# Patient Record
Sex: MALE | Race: WHITE | HISPANIC OR LATINO | Employment: UNEMPLOYED | URBAN - NONMETROPOLITAN AREA
[De-identification: names, ages, dates, MRNs, and addresses within clinical notes are randomized per-mention and may not be internally consistent; named-entity substitution may affect disease eponyms.]

---

## 2018-11-22 ENCOUNTER — HOSPITAL ENCOUNTER (EMERGENCY)
Facility: HOSPITAL | Age: 28
Discharge: HOME | End: 2018-11-22
Admitting: PHYSICIAN ASSISTANT

## 2018-11-22 VITALS
TEMPERATURE: 97.4 F | DIASTOLIC BLOOD PRESSURE: 63 MMHG | OXYGEN SATURATION: 98 % | RESPIRATION RATE: 16 BRPM | WEIGHT: 180 LBS | HEART RATE: 60 BPM | HEIGHT: 71 IN | SYSTOLIC BLOOD PRESSURE: 122 MMHG | BODY MASS INDEX: 25.2 KG/M2

## 2018-11-22 DIAGNOSIS — H10.31 ACUTE BACTERIAL CONJUNCTIVITIS OF RIGHT EYE: ICD-10-CM

## 2018-11-22 DIAGNOSIS — H16.001 CORNEAL ULCER OF RIGHT EYE: Primary | ICD-10-CM

## 2018-11-22 DIAGNOSIS — T15.91XA FOREIGN BODY OF RIGHT EYE, INITIAL ENCOUNTER: ICD-10-CM

## 2018-11-22 PROCEDURE — 99283 EMERGENCY DEPT VISIT LOW MDM: CPT

## 2018-11-22 RX ORDER — ERYTHROMYCIN 5 MG/G
OINTMENT OPHTHALMIC
Qty: 1 EACH | Refills: 0 | Status: ON HOLD | OUTPATIENT
Start: 2018-11-22 | End: 2021-10-15

## 2018-11-22 RX ORDER — ERYTHROMYCIN 5 MG/G
OINTMENT OPHTHALMIC EVERY 12 HOURS
Status: DISCONTINUED | OUTPATIENT
Start: 2018-11-22 | End: 2018-11-22 | Stop reason: HOSPADM

## 2018-11-22 RX ORDER — TETRACAINE HYDROCHLORIDE 5 MG/ML
2 SOLUTION OPHTHALMIC ONCE
Status: COMPLETED | OUTPATIENT
Start: 2018-11-22 | End: 2018-11-22

## 2018-11-22 RX ADMIN — ERYTHROMYCIN: 5 OINTMENT OPHTHALMIC at 18:58

## 2018-11-22 RX ADMIN — TETRACAINE HYDROCHLORIDE 2 DROP: 5 SOLUTION OPHTHALMIC at 18:22

## 2018-11-22 RX ADMIN — FLUORESCEIN SODIUM 1 STRIP: 1 STRIP OPHTHALMIC at 18:23

## 2018-11-23 ENCOUNTER — HOSPITAL ENCOUNTER (EMERGENCY)
Facility: HOSPITAL | Age: 28
Discharge: HOME | End: 2018-11-23
Admitting: EMERGENCY MEDICINE

## 2018-11-23 VITALS
DIASTOLIC BLOOD PRESSURE: 66 MMHG | WEIGHT: 180 LBS | BODY MASS INDEX: 25.2 KG/M2 | SYSTOLIC BLOOD PRESSURE: 105 MMHG | HEIGHT: 71 IN | RESPIRATION RATE: 17 BRPM | OXYGEN SATURATION: 99 % | HEART RATE: 71 BPM | TEMPERATURE: 98 F

## 2018-11-23 DIAGNOSIS — H20.9 IRITIS OF RIGHT EYE: ICD-10-CM

## 2018-11-23 DIAGNOSIS — H57.11 ACUTE RIGHT EYE PAIN: Primary | ICD-10-CM

## 2018-11-23 PROCEDURE — 25010000002 TDAP 5-2.5-18.5 LF-MCG/0.5 SUSPENSION: Performed by: PHYSICIAN ASSISTANT

## 2018-11-23 PROCEDURE — 90471 IMMUNIZATION ADMIN: CPT | Performed by: PHYSICIAN ASSISTANT

## 2018-11-23 PROCEDURE — 96372 THER/PROPH/DIAG INJ SC/IM: CPT

## 2018-11-23 PROCEDURE — 90715 TDAP VACCINE 7 YRS/> IM: CPT | Performed by: PHYSICIAN ASSISTANT

## 2018-11-23 PROCEDURE — 25010000003 MORPHINE 5 MG/ML SOLUTION: Performed by: EMERGENCY MEDICINE

## 2018-11-23 PROCEDURE — 99283 EMERGENCY DEPT VISIT LOW MDM: CPT

## 2018-11-23 RX ORDER — MORPHINE SULFATE 5 MG/ML
4 INJECTION, SOLUTION INTRAMUSCULAR; INTRAVENOUS ONCE
Status: COMPLETED | OUTPATIENT
Start: 2018-11-23 | End: 2018-11-23

## 2018-11-23 RX ORDER — HYDROCODONE BITARTRATE AND ACETAMINOPHEN 7.5; 325 MG/1; MG/1
1 TABLET ORAL EVERY 4 HOURS PRN
Qty: 8 TABLET | Refills: 0 | Status: ON HOLD | OUTPATIENT
Start: 2018-11-23 | End: 2021-10-15

## 2018-11-23 RX ORDER — PROPARACAINE HYDROCHLORIDE 5 MG/ML
2 SOLUTION/ DROPS OPHTHALMIC ONCE
Status: COMPLETED | OUTPATIENT
Start: 2018-11-23 | End: 2018-11-23

## 2018-11-23 RX ORDER — ONDANSETRON 4 MG/1
4 TABLET, ORALLY DISINTEGRATING ORAL ONCE
Status: COMPLETED | OUTPATIENT
Start: 2018-11-23 | End: 2018-11-23

## 2018-11-23 RX ADMIN — PROPARACAINE HYDROCHLORIDE 2 DROP: 5 SOLUTION/ DROPS OPHTHALMIC at 17:13

## 2018-11-23 RX ADMIN — TETANUS TOXOID, REDUCED DIPHTHERIA TOXOID AND ACELLULAR PERTUSSIS VACCINE, ADSORBED 0.5 ML: 5; 2.5; 8; 8; 2.5 SUSPENSION INTRAMUSCULAR at 18:06

## 2018-11-23 RX ADMIN — MORPHINE SULFATE 4 MG: 5 INJECTION, SOLUTION INTRAMUSCULAR; INTRAVENOUS at 18:06

## 2018-11-23 RX ADMIN — ONDANSETRON 4 MG: 4 TABLET, ORALLY DISINTEGRATING ORAL at 18:06

## 2018-11-23 RX ADMIN — FLUORESCEIN SODIUM 1 STRIP: 1 STRIP OPHTHALMIC at 17:13

## 2018-11-23 NOTE — DISCHARGE INSTRUCTIONS
Uveítis  (Uveitis)  La uveítis es la hinchazón e irritación del triston. La mayoría de las veces, afecta la región ocular central (úvea). Hay diferentes tipos de uveítis:  · Iritis. Ana Paula tipo afecta la parte coloreada del triston.  · Uveítis intermedia. Ana Paula tipo afecta la parte media del triston.  · Uveítis posterior. Ana Paula tipo afecta la parte posterior del triston.  · Panuveítis. Ana Paula tipo afecta todas las capas del triston.  CUIDADOS EN EL HOGAR  · [place] los medicamentos solamente mellissa se lo haya indicado el médico. Estos incluyen los medicamentos recetados y de venta raghu.  · Siga las indicaciones del médico respecto de las actividades que son seguras para usted.  · No use productos que contengan tabaco. Estos incluyen cigarrillos, tabaco para mascar y cigarrillos electrónicos. Si necesita ayuda para dejar de fumar, consulte al médico.  · Concurra a todas las visitas de control mellissa se lo haya indicado el médico. [place] es importante.  SOLICITE AYUDA SI:  · Los medicamentos no le hacen efecto.  SOLICITE AYUDA DE INMEDIATO SI:  · Hay enrojecimiento en kesha o ambos ojos.  · La stefani le molesta mucho en los ojos.  · Siente dolor en el triston.  · Siente dolor continuo en el triston.  · No puede alley igual que antes.  Esta información no tiene mellissa fin reemplazar el consejo del médico. Asegúrese de hacerle al médico cualquier pregunta que tenga.  Document Released: 05/03/2016 Document Revised: 05/03/2016 Document Reviewed: 12/23/2015  Elsevier Interactive Patient Education © 2018 [employer]

## 2018-11-23 NOTE — ED NOTES
Pt in rp filling out papers that registration gave them.     Yumiko Sarmiento, RN  11/22/18 1913

## 2018-11-23 NOTE — ED PROVIDER NOTES
Subjective   History of Present Illness   He is a 28 year old male with no chronic metabolic condition who presents today with the chief complaint of right eye pain and redness since last one week. The pain, redness, watery discharge all started at once. Since past two days he also complains about pain in the right eye when he tries to look down. He denies any symptoms in the left eye. Complains of associated sorethroat. Denies fever, runny nose, ear ache, ear fullness, ear pain, sinus pain, chest pain, dizziness. He works as a  and he denies any possibility of foreign body in the  Eye. He has already seen two physicians for the same reason and was prescribed oflox eye drops. He started the drops one day before and report mild improvement of redness but no change in the pain.    Review of Systems   Constitutional: Negative.    HENT: Negative.    Eyes: Positive for photophobia (Right), pain (Right), discharge (Right), redness (Right) and visual disturbance (Right).   Respiratory: Negative.    Cardiovascular: Negative.    Gastrointestinal: Negative.    Genitourinary: Negative.    Musculoskeletal: Negative.    Skin: Negative.    Allergic/Immunologic: Negative.    Neurological: Negative.        History reviewed. No pertinent past medical history.    No Known Allergies    History reviewed. No pertinent surgical history.    History reviewed. No pertinent family history.    Social History     Socioeconomic History   • Marital status:      Spouse name: Not on file   • Number of children: Not on file   • Years of education: Not on file   • Highest education level: Not on file   Tobacco Use   • Smoking status: Never Smoker   Substance and Sexual Activity   • Alcohol use: No     Frequency: Never   • Drug use: No           Objective   Physical Exam   Constitutional: He is oriented to person, place, and time. He appears well-developed and well-nourished.   HENT:   Head: Normocephalic and atraumatic.   Left Ear:  External ear normal.   Right middlle ear erythema noted.    Eyes: EOM are normal. Pupils are equal, round, and reactive to light. Right eye exhibits discharge and exudate. Right eye exhibits no hordeolum. No foreign body present in the right eye. Left eye exhibits no chemosis, no discharge, no exudate and no hordeolum. No foreign body present in the left eye. Right conjunctiva is injected. Right conjunctiva has no hemorrhage. Left conjunctiva is not injected. Left conjunctiva has no hemorrhage.       Neck: Normal range of motion.   Cardiovascular: Regular rhythm and normal heart sounds.   Pulmonary/Chest: Effort normal and breath sounds normal.   Abdominal: Soft. Bowel sounds are normal.   Musculoskeletal: Normal range of motion.   Neurological: He is alert and oriented to person, place, and time.   Skin: Skin is warm and dry.       Procedures           ED Course  ED Course as of Nov 22 1907   Thu Nov 22, 2018 1903 Fluorescein eye exam was done by Galina and the findings were noticed both by her and me.  [SR]      ED Course User Index  [SR] Tatyana Zendejas PA-C      He is a 28 year old  male who presents to the ED with a chief complaint of redness and pain in the right eye since past one week. The fluorescein exam revealed corneal ulcer, scratch marks on right conjunctiva and a small round foreign body was noted at 7 o clock position was noted. Efforts were made to irrigate the eye and get red of foreign body. But it is still intact. Patient was instructed to follow up with ophthalmologist as needed for the above findings. A list of ophthalmologists has been given to him. He was discharged with erythromycin ointment and recommended to continue oflox drops which he has been  Using since past 2 days. He was stable on discharge            MDM      Final diagnoses:   Corneal ulcer of right eye   Acute bacterial conjunctivitis of right eye   Foreign body of right eye, initial encounter            Cristiana  MARQUIS Joe  11/22/18 1907

## 2018-11-23 NOTE — ED PROVIDER NOTES
Subjective     Eye Problem   Associated symptoms: discharge, headaches, photophobia and redness      Patient is a 28-year-old  man presents to ED with his significant other who is the .  The patient's chief complaint is recurrent eye discomfort.  She describes that he has had some eye issue for a long time now where he wakes up with it intermittently irritated.  He would usually resolve quickly.  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  In the past week, he noticed some irritation after work.  He denies any high velocity impact or any injury at work that contributed to his eye discomfort.  He initially went to [facility] and was given some type of saline solution a week ago.  He returned to the same institution about 4 days ago and was prescribed ofloxacin otic suspension.  The patient had been using it briefly which provided minimal relief.  Yesterday morning, he felt as if something was stuck in his right upper lateral eyelid and came to this ER then.  This examiner assisted the provider signed on to the chart.  On his eye examination, he was noted to have horizontally linear multiple abrasions within a large [place] in the middle of his.  He also had a smaller deeper corneal abrasion at 7 o'clock about 1 mm in diameter.  No foreign body was grossly palpated or evaluated.  Dr. Meraz, the ER physician, evaluated the patient as well.  He was advised to start gentamicin ointment in conjunction with his ofloxacin and to follow-up with ophthalmology.  Because yesterday was Thanksgiving, he was instructed to return to ED if he could not get in soon enough to the ophthalmology and had recurrent issues.    The patient describes yesterday after the examination he felt fine.  This morning he felt fine.  This afternoon, he felt a sensation as he points his inner upper lid that was similar to yesterday's sensation on the outer upper lid.  He does complain of  "continued photophobia.  He reports the yellow Drainage has resolved.  He has continued watery drainage.  He denies any visual loss.  He denies any other injury or impact. He denies facial pain.  He has not been working.  He denies wearing contacts.  He is not up-to-date with tetanus vaccination.    Review of Systems   Eyes: Positive for photophobia, pain, discharge and redness. Negative for visual disturbance.   Neurological: Positive for headaches.   All other systems reviewed and are negative.      History reviewed. No pertinent past medical history.    No Known Allergies    History reviewed. No pertinent surgical history.    History reviewed. No pertinent family history.    Social History     Socioeconomic History   • Marital status:      Spouse name: Not on file   • Number of children: Not on file   • Years of education: Not on file   • Highest education level: Not on file   Tobacco Use   • Smoking status: Never Smoker   Substance and Sexual Activity   • Alcohol use: No     Frequency: Never   • Drug use: No       Prior to Admission medications    Medication Sig Start Date End Date Taking? Authorizing Provider   erythromycin (ROMYCIN) 5 MG/GM ophthalmic ointment Administer  to the right eye Every 4 (Four) Hours While Awake. 11/22/18   Tatyana Zendejas PA-C       Medications   fluorescein ophthalmic strip 1 strip (not administered)   proparacaine (ALCAINE) 0.5 % ophthalmic solution 2 drop (2 drops Right Eye Given by Other 11/23/18 1713)   fluorescein ophthalmic strip 1 strip (1 strip Both Eyes Given by Other 11/23/18 1713)   Tdap (BOOSTRIX) injection 0.5 mL (0.5 mL Intramuscular Given 11/23/18 1806)   Morphine injection 4 mg (4 mg Intramuscular Given 11/23/18 1806)   ondansetron ODT (ZOFRAN-ODT) disintegrating tablet 4 mg (4 mg Oral Given 11/23/18 1806)       /62   Pulse 72   Temp 98.2 °F (36.8 °C)   Resp 17   Ht 180.3 cm (71\")   Wt 81.6 kg (180 lb)   SpO2 97%   BMI 25.10 kg/m²       Objective "   Physical Exam   Constitutional: He is oriented to person, place, and time. He appears well-developed and well-nourished.   HENT:   Head: Normocephalic and atraumatic.   Right Ear: External ear normal.   Left Ear: External ear normal.   Nose: Nose normal.   Mouth/Throat: Oropharynx is clear and moist.   Eyes: EOM are normal. Pupils are equal, round, and reactive to light. Lids are everted and swept, no foreign bodies found. Right eye exhibits no chemosis, no discharge, no exudate and no hordeolum. No foreign body present in the right eye. Right conjunctiva is injected. Right conjunctiva has no hemorrhage. No scleral icterus. Right eye exhibits normal extraocular motion and no nystagmus. Left eye exhibits normal extraocular motion and no nystagmus.   Slit lamp exam:       The right eye shows corneal abrasion and fluorescein uptake. The right eye shows no corneal flare, no corneal ulcer, no foreign body, no hyphema and no anterior chamber bulge.       Mild to moderately swollen upper eyelid    Eye exam also completed with Dr. Jaki Baker   Neck: Normal range of motion. Neck supple. No tracheal deviation present.   Cardiovascular: Normal rate, regular rhythm, normal heart sounds and intact distal pulses. Exam reveals no gallop and no friction rub.   No murmur heard.  Pulmonary/Chest: Effort normal and breath sounds normal. No respiratory distress. He has no wheezes. He has no rales. He exhibits no tenderness.   Musculoskeletal: Normal range of motion. He exhibits no edema, tenderness or deformity.   Neurological: He is alert and oriented to person, place, and time. He has normal reflexes.   Skin: Skin is warm and dry. Capillary refill takes less than 2 seconds. No rash noted. No erythema. No pallor.   Psychiatric: He has a normal mood and affect. His behavior is normal. Judgment and thought content normal.   Vitals reviewed.      Procedures         Lab Results (last 24 hours)     ** No results found for the last 24  hours. **          No results found.    ED Course        Eye exam from yesterday:     Visual Acuity-Left  20/20  --            at 11/22/18 1814           Visual Acuity-Right  20/25  --            at 11/22/18 1814               Dr. Baker does not believe that he has presentations of corneal abrasions or ulcerations.  He is concerned that he may have iritis.  We called Dr. Romero, opthamologist on call and his technician, Juan, called back. She then directly spoke with Dr. Romero.  He requests the patient be discharged with continuation of the ointment and ophthalmic suspension.  He wants the patient in his office tomorrow at 8:30 AM.  I relayed this to the patient and his wife.  They voiced understanding and will be discharged in stable condition.    MDM    Final diagnoses:   Acute right eye pain   Iritis of right eye          Alexandra Ennis PA  11/23/18 2088

## 2018-11-23 NOTE — DISCHARGE INSTRUCTIONS
Conjuntivitis bacteriana  (Bacterial Conjunctivitis)  La conjuntivitis bacteriana es ana infección de la conjuntiva. Esta es la membrana transparente que recubre la parte lance del triston y la superficie interna del párpado. Esta afección puede causar los siguientes síntomas en el triston:  · Color alexandra o silva.  · Picazón de la piel.  La causa de esta afección son las bacterias. Se contagia fácilmente de ana persona a otra y de un triston al otro .  CUIDADOS EN EL HOGAR  Medicamentos  · [place] o aplique el antibiótico mellissa se lo haya indicado el médico. No deje de fabiola los antibióticos o de aplicárselos aunque comience a sentirse mejor.  · [place] o aplique los medicamentos de venta raghu y los recetados solamente mellissa se lo haya indicado el médico.  · No toque el párpado con el frasco de gotas para los ojos o el tubo de pomada.  Control de las molestias  · Límpiese la secreción del triston con un paño húmedo y tibio o con ana torunda de algodón.  · Colóquese un paño limpio y frío sobre el triston. Lorenza esto sarmad 10 a 20 minutos, 3 o 4 veces al día.  Instrucciones generales  · No use lentes de contacto hasta que la irritación haya desaparecido. Use anteojos hasta que el médico lo autorice a ponerse lentes de contacto.  · No se aplique maquillaje en los ojos hasta que los síntomas hayan desaparecido. Deseche el maquillaje kathie.  · Cambie o lave quiles almohada todos los días.  · No comparta las toallas o los paños con ninguna persona.  · Lave megan rossana frecuentemente con agua y jabón. Use toallas de papel para secarse las rossana.  · No se toque ni se frote los ojos.  · No conduzca ni use maquinaria pesada si tiene la visión borrosa.  SOLICITE AYUDA SI:  · Tiene fiebre.  · Los síntomas no mejoran después de 10 días de tratamiento.  SOLICITE AYUDA DE INMEDIATO SI:  · Tiene fiebre y los síntomas empeoran repentinamente.  · Siente dolor muy intenso cuando mueve el triston.  · El crow:  ? Le duele.  ? Se le enrojece.  ? Está hinchado.  · Pierde  la visión repentinamente.  Esta información no tiene mellissa fin reemplazar el consejo del médico. Asegúrese de hacerle al médico cualquier pregunta que tenga.  Document Released: 06/18/2013 Document Revised: 04/10/2017 Document Reviewed: 09/29/2016  [employer] Interactive Patient Education © 2018 [employer]      Ulcera de córnea  (Corneal Ulcer)  Ana úlcera de córnea es ana llaga en la córnea. La córnea es la cubierta transparente en la parte anterior y central del triston.  CAUSAS  La mayoría de las úlceras de córnea son causadas por infecciones, paty también puede sasha otras causas.  · Infecciones bacterianas. Ana infección bacteriana puede causar ana úlcera corneal si:  ? Se usan los lentes de contacto demasiado tiempo (especialmente sarmad la noche) o no están debidamente cuidados.  ? Ana lesión ocular que permite que las bacterias infecten el área de la lesión.  · Infecciones virales. Ana infección viral puede causar ana úlcera de córnea si:  ? El triston se infecta con un virus, mellissa un herpes simple (herpes labial), virus de la varicela, herpes zóster o virus.  · Infecciones por hongos. Ana infección por hongos puede causar ana úlcera de córnea si:  ? Hubo ana lesión por contacto con ana planta o material de ana planta.  ? Uso excesivo de gotas antiinflamatorias.  ? Dill sistema inmunológico está debilitado.  ? Las lentes de contacto se usan inadecuadamente lo que favorece la infección.  · Cuerpos extraños en el triston, mellissa arena, juan, o pequeñas fragmentos de juan o metal.  · Ojos secos.  · Ciertos trastornos que impiden que los párpados se cierren por completo, mellissa la parálisis de [place].  · Lentes de contacto, en especial lentes de contacto blandos de uso prolongado. Los lentes de contacto pueden:  ? Raspar la superficie de la córnea, permitiendo que las bacterias entren por la herida.  ? Retener la suciedad debajo del lente de contacto, lo cual puede dañar la córnea.  ? Albergar bacterias y hongos, por lo que es  más probable que se produzcan las infecciones bacterianas.  ? Obstruir la entrada de oxígeno a la córnea, por lo que es más probable que se produzcan infecciones.  SÍNTOMAS  · Dolor en los ojos que generalmente es intenso.  · Visión borrosa.  · Sensibilidad a la stefani.  · Secreción espesa o de pus debajo del triston.  · Enrojecimiento del triston.  · Sensación de tener algo en el triston.  · Triston lloroso o que pica.  · Ardor o quemazón.  Algunas úlceras, si son muy grandes, pueden verse mellissa ana megan lance en la córnea.  DIAGNÓSTICO  Le harán un examen de vista. El médico podría usar un tipo especial de microscopio (lámpara de hendidura) para examinar la córnea. Podrá aplicarle gotas en el triston para hacer que la úlcera sea más fácil de examinar. Si se sospecha que ana infección causó la úlcera, se puede fabiola unas muestras de tejido o cultivos del triston. Le aplicará gotas para anestesiar el triston antes de fabiola las muestras o cultivos. Las muestras o cultivos se examinarán en el laboratorio para verificar si hay bacterias, virus u hongos.  TRATAMIENTO  El tratamiento de la úlcera de la córnea depende de la causa. Si la úlcera es grave, es posible que le den gotas antibióticas hasta que el médico conozca los resultados de los estudios. Otros tratamientos pueden ser:  · Gotas o ungüentos antibacterianos, antivirales o antimicóticos .  · Extracción del cuerpo extraño que causó la lesión en el triston.  · Lágrimas artificiales o lentes de contacto que actúan mellissa vendaje si la intensidad de la sequedad ocular fue la causa de la úlcera corneal.  · Medicamentos para el dolor de venta raghu o con receta.  · Gotas con corticoides si el triston se inflama y se hincha.  · Antibióticos por vía oral.  · Inyección de medicamentos bajo la membrana delgada que cubre el globo ocular (conjuntiva). [place] permite que la medicina llegue a la úlcera en dosis altas.  · Parche ocular para evitar la irritación fracisco intermitente y brillante. No se colocará un  parche en el triston si la úlcera fue causada por ana infección bacteriana.  Si la úlcera provoca ana cicatriz en la córnea que interfiere con la visión, puede ser necesaria la hospitalización y la cirugía para reemplazar la córnea (trasplante de córnea).  INSTRUCCIONES PARA EL CUIDADO EN EL HOGAR  · Si se lo shepherd recetado, tome los antibióticos, colóquese las gotas o el ungüento, según las indicaciones. Tómelos todos, aunque comience a sentirse mejor. Tendrá que aplicarse gotas con ana frecuencia entre unos pocos minutos y ana hora, sarmad varios días. Podrá ser necesario activar la alarma del reloj sarmad la noche, según la frecuencia indicada. [place] es absolutamente necesario.  · [place] sólo medicamentos de venta raghu o recetados, según las indicaciones del médico.  · Aplique las lágrimas artificiales, según sea necesario, si tiene los ojos secos.  · Notoque ni frote megan ojos, porque puede aumentar la irritación y propagar la infección.  · Evite el uso de maquillaje.  · Manténgase en ana taylor oscura y utilice lentes de sol para reducir la sensibilidad a la stefani.  · Aplique compresas frías en el triston para aliviar las molestias y la hinchazón.  · Si tiene un parche en el triston no deberá manejar ni operar maquinarias. Tendrá reducida la visión y la capacidad para juzgar distancias.  · Noconduzca ni opere maquinarias hasta que lo autorice el médico. En estas condiciones no puede juzgar correctamente las distancias.  · Concurra a las consultas de control con quiles médico según las indicaciones.  · No conduzca vehículos hasta que el médico la autorice. Si normalmente usa lentes de contacto, siga estas reglas generales para evitar el riesgo de ana úlcera de córnea:  ? Nouse lentes de contacto mientras duerme.  ? Lávese las rossana antes de retirar los lentes de contacto.  ? Esterilice y almacene adecuadamente los lentes de contacto.  ? Limpie regularmente la caja en la que los guarda.  ? Noaplique saliva ni agua del grifo para  limpiar o humedecer megan lentes de contacto.  ? Quítese los lentes de contacto si siente que megan ojos se irritan. Podrá volver a colocarlas ana vez que megan ojos estén mejor.    SOLICITE ATENCIÓN MÉDICA DE INMEDIATO SI:  · Nota algún cambio en quiles visión.  · El dolor empeora en vez de mejorar.  · Aumenta la supuración en el triston.    ASEGÚRESE DE QUE:  · Comprende estas instrucciones.  · Controlará quiles afección.  · Recibirá ayuda de inmediato si no mejora o si empeora.    Esta información no tiene mellissa fin reemplazar el consejo del médico. Asegúrese de hacerle al médico cualquier pregunta que tenga.  Document Released: 12/18/2006 Document Revised: 01/08/2016 Document Reviewed: 05/20/2014  [employer] Interactive Patient Education © 2017 [employer]      Conjuntivitis bacteriana  (Bacterial Conjunctivitis)  La conjuntivitis bacteriana es ana infección de la membrana transparente que cubre la parte lance del triston y la jana interna del párpado (conjuntiva). Cuando los vasos sanguíneos de la conjuntiva se inflaman, el triston se pone alexandra o toro, y es posible que le pique. La conjuntivitis bacteriana se transmite fácilmente de ana persona a la otra (es contagiosa). También se contagia fácilmente de un triston al otro.  CAUSAS  La causa de esta afección son varias bacterias comunes. Puede contraer la infección si entra en contacto con otra persona que está infectada. También puede entrar en contacto con elementos que estén contaminados con la bacteria, mellissa ana toalla para la jana, solución para lentes de contacto o maquillaje para ojos.  FACTORES DE RIESGO  Es más probable que esta afección se manifieste en las personas que:  · Mantienen contacto físico con personas que tienen la infección.  · Usan lentes de contacto.  · Tienen sinusitis.  · Mott tenido ana lesión o cirugía reciente en el triston.  · Tiene debilitado el sistema de defensa del organismo (sistema inmunitario).  · Tienen ana afección médica que causa sequedad en los  ojos.  SÍNTOMAS  Los síntomas de esta afección incluyen lo siguiente:  · Ojos rojos.  · Lagrimeo u ojos llorosos.  · Picazón en los ojos.  · Sensación de ardor en los ojos.  · Secreción espesa y amarillenta del triston. Esta secreción puede convertirse en ana costra en el párpado sarmad la noche, que hace que los párpados se peguen.  · Hinchazón de los párpados.  · Visión borrosa.  DIAGNÓSTICO  El médico puede diagnosticar esta afección en función de los síntomas y los antecedentes médicos. El médico también puede obtener ana muestra de la secreción del triston para averiguar la causa de la infección. [place] no se hace con frecuencia.  TRATAMIENTO  El tratamiento de esta afección incluye lo siguiente:  · Gotas o ungüento para los ojos con antibiótico para erradicar la infección con más rapidez y evitar el contagio a otras personas.  · Antibióticos por vía oral para tratar infecciones que no responden a las gotas o los ungüentos, o que wills más de 10 días.  · Paños húmedos fríos (compresas frías) sobre los ojos.  · Lágrimas artificiales aplicadas 2 a 6 veces por día.  INSTRUCCIONES PARA EL CUIDADO EN EL HOGAR  Medicamentos  · [place] los antibióticos o aplíqueselos mellissa se lo haya indicado el médico. No deje de fabiola los antibióticos o de aplicárselos aunque comience a sentirse mejor.  · [place] o aplíquese los medicamentos de venta raghu y recetados solamente mellissa se lo haya indicado el médico.  · Tenga mucho cuidado de no tocar el borde del párpado con el frasco de las gotas para los ojos o el tubo del ungüento cuando aplica los medicamentos en el triston afectado. [place] evitará que se contagie la infección al otro triston o a otras personas.  Control de las molestias  · Retire suavemente la secreción de los ojos con un paño tibio y húmedo o con ana torunda de algodón.  · Aplíquese un paño frío y limpio en el triston sarmad 10 a 20 minutos, 3 a 4 veces al día.  Instrucciones generales  · No use lentes de contacto hasta que haya desaparecido  la inflamación y quiles médico le indique que es seguro usarlos nuevamente. Pregúntele al médico cómo esterilizar o reemplazar megan lentes de contacto antes de usarlos nuevamente. Use anteojos hasta que pueda volver a usar los lentes de contacto.  · Evite usar maquillaje en los ojos hasta que la inflamación se haya dhaval. Descarte cosméticos viejos para los ojos que puedan estar contaminados.  · Cambie o lave quiles almohada todos los días.  · No comparta las toallas o los paños. [place] puede propagar la infección.  · Lave megan rossana frecuentemente con agua y jabón. Use toallas de papel para secarse las rossana.  · Evite tocarse o frotarse los ojos.  · No conduzca ni use maquinaria pesada si quiles visión es borrosa.  SOLICITE ATENCIÓN MÉDICA SI:  · Tiene fiebre.  · Los síntomas no mejoran después de 10 días de tratamiento.    SOLICITE ATENCIÓN MÉDICA DE INMEDIATO SI:  · Tiene fiebre y los síntomas empeoran repentinamente.  · Siente dolor intenso cuando mueve el triston.  · Siente dolor u observa hinchazón o enrojecimiento en la jana.  · Pierde la visión repentinamente.    Esta información no tiene mellissa fin reemplazar el consejo del médico. Asegúrese de hacerle al médico cualquier pregunta que tenga.  Document Released: 09/27/2006 Document Revised: 04/10/2017 Document Reviewed: 09/29/2016  [employer] Interactive Patient Education © 2017 [employer]

## 2018-11-23 NOTE — ED NOTES
"Pt wants a \"packet\" that registration told them about \"to help pay the bill\", registration aware.     Yumiko Sarmiento RN  11/22/18 1908    "

## 2021-10-13 ENCOUNTER — HOSPITAL ENCOUNTER (INPATIENT)
Facility: HOSPITAL | Age: 31
LOS: 2 days | Discharge: HOME | End: 2021-10-15
Attending: FAMILY MEDICINE | Admitting: INTERNAL MEDICINE

## 2021-10-13 ENCOUNTER — APPOINTMENT (OUTPATIENT)
Dept: CT IMAGING | Facility: HOSPITAL | Age: 31
End: 2021-10-13

## 2021-10-13 DIAGNOSIS — J39.2 NASOPHARYNGEAL MASS: Primary | ICD-10-CM

## 2021-10-13 DIAGNOSIS — U07.1 COVID: ICD-10-CM

## 2021-10-13 LAB
ALBUMIN SERPL-MCNC: 4.5 G/DL (ref 3.5–5.2)
ALBUMIN/GLOB SERPL: 1.6 G/DL
ALP SERPL-CCNC: 93 U/L (ref 39–117)
ALT SERPL W P-5'-P-CCNC: 16 U/L (ref 1–41)
ANION GAP SERPL CALCULATED.3IONS-SCNC: 7 MMOL/L (ref 5–15)
AST SERPL-CCNC: 37 U/L (ref 1–40)
BASOPHILS # BLD AUTO: 0.02 10*3/MM3 (ref 0–0.2)
BASOPHILS NFR BLD AUTO: 0.4 % (ref 0–1.5)
BILIRUB SERPL-MCNC: 0.3 MG/DL (ref 0–1.2)
BUN SERPL-MCNC: 7 MG/DL (ref 6–20)
BUN/CREAT SERPL: 10.3 (ref 7–25)
CALCIUM SPEC-SCNC: 9 MG/DL (ref 8.6–10.5)
CHLORIDE SERPL-SCNC: 97 MMOL/L (ref 98–107)
CO2 SERPL-SCNC: 32 MMOL/L (ref 22–29)
CREAT SERPL-MCNC: 0.68 MG/DL (ref 0.76–1.27)
DEPRECATED RDW RBC AUTO: 40.4 FL (ref 37–54)
EOSINOPHIL # BLD AUTO: 0.01 10*3/MM3 (ref 0–0.4)
EOSINOPHIL NFR BLD AUTO: 0.2 % (ref 0.3–6.2)
ERYTHROCYTE [DISTWIDTH] IN BLOOD BY AUTOMATED COUNT: 12.4 % (ref 12.3–15.4)
GFR SERPL CREATININE-BSD FRML MDRD: 139 ML/MIN/1.73
GLOBULIN UR ELPH-MCNC: 2.8 GM/DL
GLUCOSE SERPL-MCNC: 120 MG/DL (ref 65–99)
HCT VFR BLD AUTO: 43.3 % (ref 37.5–51)
HETEROPH AB SER QL LA: NEGATIVE
HGB BLD-MCNC: 14.5 G/DL (ref 13–17.7)
IMM GRANULOCYTES # BLD AUTO: 0.01 10*3/MM3 (ref 0–0.05)
IMM GRANULOCYTES NFR BLD AUTO: 0.2 % (ref 0–0.5)
LYMPHOCYTES # BLD AUTO: 1.15 10*3/MM3 (ref 0.7–3.1)
LYMPHOCYTES NFR BLD AUTO: 24.1 % (ref 19.6–45.3)
MCH RBC QN AUTO: 29.6 PG (ref 26.6–33)
MCHC RBC AUTO-ENTMCNC: 33.5 G/DL (ref 31.5–35.7)
MCV RBC AUTO: 88.4 FL (ref 79–97)
MONOCYTES # BLD AUTO: 0.79 10*3/MM3 (ref 0.1–0.9)
MONOCYTES NFR BLD AUTO: 16.6 % (ref 5–12)
NEUTROPHILS NFR BLD AUTO: 2.79 10*3/MM3 (ref 1.7–7)
NEUTROPHILS NFR BLD AUTO: 58.5 % (ref 42.7–76)
NRBC BLD AUTO-RTO: 0 /100 WBC (ref 0–0.2)
PLATELET # BLD AUTO: 169 10*3/MM3 (ref 140–450)
PMV BLD AUTO: 10 FL (ref 6–12)
POTASSIUM SERPL-SCNC: 3.8 MMOL/L (ref 3.5–5.2)
PROT SERPL-MCNC: 7.3 G/DL (ref 6–8.5)
RBC # BLD AUTO: 4.9 10*6/MM3 (ref 4.14–5.8)
S PYO AG THROAT QL: NEGATIVE
SARS-COV-2 RNA PNL SPEC NAA+PROBE: DETECTED
SODIUM SERPL-SCNC: 136 MMOL/L (ref 136–145)
WBC # BLD AUTO: 4.77 10*3/MM3 (ref 3.4–10.8)

## 2021-10-13 PROCEDURE — 25010000002 IOPAMIDOL 61 % SOLUTION: Performed by: PHYSICIAN ASSISTANT

## 2021-10-13 PROCEDURE — 25010000002 VANCOMYCIN 10 G RECONSTITUTED SOLUTION

## 2021-10-13 PROCEDURE — 25010000002 CEFTRIAXONE PER 250 MG: Performed by: FAMILY MEDICINE

## 2021-10-13 PROCEDURE — 85025 COMPLETE CBC W/AUTO DIFF WBC: CPT | Performed by: PHYSICIAN ASSISTANT

## 2021-10-13 PROCEDURE — 99285 EMERGENCY DEPT VISIT HI MDM: CPT

## 2021-10-13 PROCEDURE — 80053 COMPREHEN METABOLIC PANEL: CPT | Performed by: PHYSICIAN ASSISTANT

## 2021-10-13 PROCEDURE — 70491 CT SOFT TISSUE NECK W/DYE: CPT

## 2021-10-13 PROCEDURE — 25010000002 DEXAMETHASONE PER 1 MG: Performed by: PHYSICIAN ASSISTANT

## 2021-10-13 PROCEDURE — 99223 1ST HOSP IP/OBS HIGH 75: CPT | Performed by: OTOLARYNGOLOGY

## 2021-10-13 PROCEDURE — 86308 HETEROPHILE ANTIBODY SCREEN: CPT | Performed by: PHYSICIAN ASSISTANT

## 2021-10-13 PROCEDURE — 87880 STREP A ASSAY W/OPTIC: CPT | Performed by: PHYSICIAN ASSISTANT

## 2021-10-13 PROCEDURE — 25010000002 VANCOMYCIN 1 G RECONSTITUTED SOLUTION 1 EACH VIAL: Performed by: FAMILY MEDICINE

## 2021-10-13 PROCEDURE — 87635 SARS-COV-2 COVID-19 AMP PRB: CPT | Performed by: PHYSICIAN ASSISTANT

## 2021-10-13 PROCEDURE — 87081 CULTURE SCREEN ONLY: CPT | Performed by: PHYSICIAN ASSISTANT

## 2021-10-13 PROCEDURE — 87040 BLOOD CULTURE FOR BACTERIA: CPT | Performed by: PHYSICIAN ASSISTANT

## 2021-10-13 RX ORDER — DEXAMETHASONE SODIUM PHOSPHATE 10 MG/ML
10 INJECTION INTRAMUSCULAR; INTRAVENOUS ONCE
Status: COMPLETED | OUTPATIENT
Start: 2021-10-13 | End: 2021-10-13

## 2021-10-13 RX ORDER — ONDANSETRON 2 MG/ML
4 INJECTION INTRAMUSCULAR; INTRAVENOUS EVERY 6 HOURS PRN
Status: DISCONTINUED | OUTPATIENT
Start: 2021-10-13 | End: 2021-10-15 | Stop reason: HOSPADM

## 2021-10-13 RX ORDER — NALOXONE HCL 0.4 MG/ML
0.4 VIAL (ML) INJECTION
Status: DISCONTINUED | OUTPATIENT
Start: 2021-10-13 | End: 2021-10-15 | Stop reason: HOSPADM

## 2021-10-13 RX ORDER — SODIUM CHLORIDE 0.9 % (FLUSH) 0.9 %
10 SYRINGE (ML) INJECTION AS NEEDED
Status: DISCONTINUED | OUTPATIENT
Start: 2021-10-13 | End: 2021-10-15 | Stop reason: HOSPADM

## 2021-10-13 RX ORDER — SODIUM CHLORIDE 0.9 % (FLUSH) 0.9 %
10 SYRINGE (ML) INJECTION EVERY 12 HOURS SCHEDULED
Status: DISCONTINUED | OUTPATIENT
Start: 2021-10-13 | End: 2021-10-15 | Stop reason: HOSPADM

## 2021-10-13 RX ORDER — DEXTROSE, SODIUM CHLORIDE, AND POTASSIUM CHLORIDE 5; .45; .15 G/100ML; G/100ML; G/100ML
100 INJECTION INTRAVENOUS CONTINUOUS
Status: DISCONTINUED | OUTPATIENT
Start: 2021-10-13 | End: 2021-10-15 | Stop reason: HOSPADM

## 2021-10-13 RX ORDER — MORPHINE SULFATE 2 MG/ML
1 INJECTION, SOLUTION INTRAMUSCULAR; INTRAVENOUS EVERY 4 HOURS PRN
Status: DISCONTINUED | OUTPATIENT
Start: 2021-10-13 | End: 2021-10-15 | Stop reason: HOSPADM

## 2021-10-13 RX ORDER — DEXAMETHASONE SODIUM PHOSPHATE 4 MG/ML
6 INJECTION, SOLUTION INTRA-ARTICULAR; INTRALESIONAL; INTRAMUSCULAR; INTRAVENOUS; SOFT TISSUE DAILY
Status: DISCONTINUED | OUTPATIENT
Start: 2021-10-14 | End: 2021-10-15 | Stop reason: HOSPADM

## 2021-10-13 RX ORDER — ACETAMINOPHEN 325 MG/1
650 TABLET ORAL EVERY 6 HOURS PRN
Status: DISCONTINUED | OUTPATIENT
Start: 2021-10-13 | End: 2021-10-15 | Stop reason: HOSPADM

## 2021-10-13 RX ADMIN — SODIUM CHLORIDE 1 G: 9 INJECTION, SOLUTION INTRAVENOUS at 20:03

## 2021-10-13 RX ADMIN — Medication 2000 MG: at 08:54

## 2021-10-13 RX ADMIN — Medication 10 ML: at 21:20

## 2021-10-13 RX ADMIN — POTASSIUM CHLORIDE, DEXTROSE MONOHYDRATE AND SODIUM CHLORIDE 100 ML/HR: 150; 5; 450 INJECTION, SOLUTION INTRAVENOUS at 20:03

## 2021-10-13 RX ADMIN — IOPAMIDOL 100 ML: 612 INJECTION, SOLUTION INTRAVENOUS at 10:00

## 2021-10-13 RX ADMIN — VANCOMYCIN HYDROCHLORIDE 1000 MG: 1 INJECTION, POWDER, LYOPHILIZED, FOR SOLUTION INTRAVENOUS at 21:19

## 2021-10-13 RX ADMIN — DEXAMETHASONE SODIUM PHOSPHATE 10 MG: 10 INJECTION INTRAMUSCULAR; INTRAVENOUS at 08:47

## 2021-10-13 RX ADMIN — SODIUM CHLORIDE, POTASSIUM CHLORIDE, SODIUM LACTATE AND CALCIUM CHLORIDE 1000 ML: 600; 310; 30; 20 INJECTION, SOLUTION INTRAVENOUS at 08:47

## 2021-10-13 NOTE — PROGRESS NOTES
"Pharmacy Dosing Service  Pharmacokinetics  Vancomycin Initial Evaluation  Assessment/Action/Plan:  Loading dose: 2000mg  Current Order: Vancomycin 1000 mg IVPB every 8 hours  Current end date:10/13/2021  Levels: Vancomycin trough ordered before dose due on 10/14/2021 at 1000  Additional antimicrobial agent(s): Ceftriaxone    Vancomycin dosage initiated based on population pharmacokinetic parameters. Pharmacy will continue to follow daily and adjust dose accordingly.     Subjective:  Paras Bronson is a 30 y.o. male with a Vancomycin \"Pharmacy to Dose\" consult for the treatment of SSTI , day 1 of 5 of treatment.    AUC Model Data:  Loading dose: 2000mg  Regimen: 1000 mg IV every 8 hours.  Start time: 18:00 on 10/13/2021  Exposure target: AUC24 (range)400-600 mg/L.hr   AUC24,ss: 431 mg/L.hr  PAUC*: 58 %  Ctrough,ss: 11.2 mg/L  Pconc*: 11 %  Tox.: 7 %      Objective:  Ht: 182.9 cm (72\"); Wt: 78 kg (172 lb)  Estimated Creatinine Clearance: 178.4 mL/min (A) (by C-G formula based on SCr of 0.68 mg/dL (L)).   Creatinine   Date Value Ref Range Status   10/13/2021 0.68 (L) 0.76 - 1.27 mg/dL Final      Lab Results   Component Value Date    WBC 4.77 10/13/2021      Baseline culture results:  Microbiology Results (last 10 days)       Procedure Component Value - Date/Time    COVID PRE-OP / PRE-PROCEDURE SCREENING ORDER (NO ISOLATION) - Swab, Nasal Cavity [[id number]]  (Abnormal) Collected: 10/13/21 0857    Lab Status: Final result Specimen: Swab from Nasal Cavity Updated: 10/13/21 0957    Narrative:      The following orders were created for panel order COVID PRE-OP / PRE-PROCEDURE SCREENING ORDER (NO ISOLATION) - Swab, Nasal Cavity.  Procedure                               Abnormality         Status                     ---------                               -----------         ------                     COVID-19,[facility]...[[id number]]  Abnormal            Final result                 Please view results for " these tests on the individual orders.    COVID-19,[facility] IN-HOUSE,Nasal Swab No Transport Media 3-4 HR TAT - Swab, Nasal Cavity [[id number]]  (Abnormal) Collected: 10/13/21 0857    Lab Status: Final result Specimen: Swab from Nasal Cavity Updated: 10/13/21 0957     COVID19 Detected    Narrative:      Fact sheet for providers: https://www.fda.gov/media/077274/download     Fact sheet for patients: https://www.fda.gov/media/962398/download    Test performed by PCR.    Consider negative results in combination with clinical observations, patient history, and epidemiological information.    Rapid Strep A Screen - Swab, Throat [[id number]]  (Normal) Collected: 10/13/21 0856    Lab Status: Final result Specimen: Swab from Throat Updated: 10/13/21 0925     Strep A Ag Negative            Gabriele Ring, PharmD  10/13/21 16:18 CDT

## 2021-10-13 NOTE — H&P
[facility] Medicine Services  HISTORY AND PHYSICAL    Date of Admission: 10/13/2021  Primary Care Physician: Provider, No Known    Subjective     Chief Complaint: Throat pain    History of Present Illness  30 year old male with no significant past medical or surgical history that presents with throat pain, voice change and poor appetite. He first noted pain and went to [facility] ER 4 weeks ago and received IV antibiotic and a prescription for steroids. With this he felt Ok for about 2 weeks. At that point the pain and difficulty swallowing started to come back and worsened progressively. States 4 days ago he felt very tired and lost his appetite. Has been only been able to have oral liquids since yesterday and decided to come for help. He is noted to have a large tonsil L>R with exudate covering. CT neck shows a large adenoid that is close to compromise the airway. ENT is consulted and recommended admission.    Incidentally he has been found to have COVID 19 and this may justify the weakness and lack of appetite he has had for the last 4 days.     Note patient is [place] speaking and the interview was conducted in [place].     Review of Systems   Otherwise complete ROS reviewed and negative except as mentioned in the HPI.    Past Medical History:   Past Medical History:   Diagnosis Date   • Tonsillitis      Past Surgical History:History reviewed. No pertinent surgical history.  Social History:  reports that he has never smoked. He does not have any smokeless tobacco history on file. He reports that he does not drink alcohol and does not use drugs.    Family History:   Patient denies family medical history.        Allergies:  No Known Allergies    Medications:  Prior to Admission medications    Medication Sig Start Date End Date Taking? Authorizing Provider   erythromycin (ROMYCIN) 5 MG/GM ophthalmic ointment Administer  to the right eye Every 4 (Four) Hours While Awake.  "11/22/18   Tatyana Zendejas PA-C   HYDROcodone-acetaminophen (NORCO) 7.5-325 MG per tablet Take 1 tablet by mouth Every 4 (Four) Hours As Needed for Moderate Pain . 11/23/18   Jaki Baker MD     I have utilized all available immediate resources to obtain, update, and review the patient's current medications.    Objective     Vital Signs: /73   Pulse 69   Temp 98.4 °F (36.9 °C) (Oral)   Resp 18   Ht 182.9 cm (72\")   Wt 78 kg (172 lb)   SpO2 94%   BMI 23.33 kg/m²   Physical Exam  Constitutional:       Appearance: He is well-developed. He is ill-appearing. He is not toxic-appearing or diaphoretic.   HENT:      Head: Normocephalic and atraumatic.      Right Ear: External ear normal.      Left Ear: External ear normal.      Nose: Nose normal.      Mouth/Throat:      Mouth: Mucous membranes are dry.      Pharynx: Oropharyngeal exudate present.      Comments: Very large tonsils specially on the left which seems to occupy most of the retro uvular area  Eyes:      General:         Right eye: No discharge.         Left eye: No discharge.      Extraocular Movements: Extraocular movements intact.      Conjunctiva/sclera: Conjunctivae normal.      Pupils: Pupils are equal, round, and reactive to light.   Neck:      Vascular: No JVD.   Cardiovascular:      Rate and Rhythm: Normal rate and regular rhythm.      Heart sounds: Normal heart sounds. No murmur heard.      Pulmonary:      Effort: Pulmonary effort is normal. No respiratory distress.      Breath sounds: Normal breath sounds. No wheezing or rales.   Chest:      Chest wall: No tenderness.   Abdominal:      General: Bowel sounds are normal. There is no distension.      Palpations: Abdomen is soft.      Tenderness: There is no abdominal tenderness. There is no guarding or rebound.   Musculoskeletal:         General: No tenderness or deformity. Normal range of motion.      Cervical back: Normal range of motion and neck supple. No rigidity.      Right lower leg: " No edema.      Left lower leg: No edema.   Skin:     General: Skin is dry.      Capillary Refill: Capillary refill takes less than 2 seconds.      Coloration: Skin is pale.      Findings: No rash.   Neurological:      General: No focal deficit present.      Mental Status: He is alert and oriented to person, place, and time. Mental status is at baseline.      Cranial Nerves: No cranial nerve deficit.      Sensory: No sensory deficit.      Motor: No abnormal muscle tone.      Deep Tendon Reflexes: Reflexes normal.   Psychiatric:         Mood and Affect: Mood normal.         Behavior: Behavior normal.              Results Reviewed:  Lab Results (last 24 hours)     Procedure Component Value Units Date/Time    COVID PRE-OP / PRE-PROCEDURE SCREENING ORDER (NO ISOLATION) - Swab, Nasal Cavity [[id number]]  (Abnormal) Collected: 10/13/21 0857    Specimen: Swab from Nasal Cavity Updated: 10/13/21 0957    Narrative:      The following orders were created for panel order COVID PRE-OP / PRE-PROCEDURE SCREENING ORDER (NO ISOLATION) - Swab, Nasal Cavity.  Procedure                               Abnormality         Status                     ---------                               -----------         ------                     COVID-19,[facility]...[[id number]]  Abnormal            Final result                 Please view results for these tests on the individual orders.    COVID-19,[facility] IN-HOUSE,Nasal Swab No Transport Media 3-4 HR TAT - Swab, Nasal Cavity [[id number]]  (Abnormal) Collected: 10/13/21 0857    Specimen: Swab from Nasal Cavity Updated: 10/13/21 0957     COVID19 Detected    Narrative:      Fact sheet for providers: https://www.fda.gov/media/608165/download     Fact sheet for patients: https://www.fda.gov/media/364359/download    Test performed by PCR.    Consider negative results in combination with clinical observations, patient history, and epidemiological information.    Blood Culture - Blood, Arm, Left  [[id number]] Collected: 10/13/21 0843    Specimen: Blood from Arm, Left Updated: 10/13/21 0947    Blood Culture - Blood, Arm, Right [[id number]] Collected: 10/13/21 0843    Specimen: Blood from Arm, Right Updated: 10/13/21 0947    Rapid Strep A Screen - Swab, Throat [[id number]]  (Normal) Collected: 10/13/21 0856    Specimen: Swab from Throat Updated: 10/13/21 0925     Strep A Ag Negative    Beta Strep Culture, Throat - Swab, Throat [[id number]] Collected: 10/13/21 0856    Specimen: Swab from Throat Updated: 10/13/21 0925    Comprehensive Metabolic Panel [[id number]]  (Abnormal) Collected: 10/13/21 0843    Specimen: Blood Updated: 10/13/21 0924     Glucose 120 mg/dL      BUN 7 mg/dL      Creatinine 0.68 mg/dL      Sodium 136 mmol/L      Potassium 3.8 mmol/L      Chloride 97 mmol/L      CO2 32.0 mmol/L      Calcium 9.0 mg/dL      Total Protein 7.3 g/dL      Albumin 4.50 g/dL      ALT (SGPT) 16 U/L      AST (SGOT) 37 U/L      Alkaline Phosphatase 93 U/L      Total Bilirubin 0.3 mg/dL      eGFR Non African Amer 139 mL/min/1.73      Globulin 2.8 gm/dL      A/G Ratio 1.6 g/dL      BUN/Creatinine Ratio 10.3     Anion Gap 7.0 mmol/L     Narrative:      GFR Normal >60  Chronic Kidney Disease <60  Kidney Failure <15      Mononucleosis Screen [[id number]]  (Normal) Collected: 10/13/21 0843    Specimen: Blood Updated: 10/13/21 0906     Monospot Negative    CBC & Differential [[id number]]  (Abnormal) Collected: 10/13/21 0843    Specimen: Blood Updated: 10/13/21 0859    Narrative:      The following orders were created for panel order CBC & Differential.  Procedure                               Abnormality         Status                     ---------                               -----------         ------                     CBC Auto Differential[[id number]]        Abnormal            Final result                 Please view results for these tests on the individual orders.    CBC Auto Differential [[id number]]  (Abnormal) Collected:  10/13/21 0843    Specimen: Blood Updated: 10/13/21 0859     WBC 4.77 10*3/mm3      RBC 4.90 10*6/mm3      Hemoglobin 14.5 g/dL      Hematocrit 43.3 %      MCV 88.4 fL      MCH 29.6 pg      MCHC 33.5 g/dL      RDW 12.4 %      RDW-SD 40.4 fl      MPV 10.0 fL      Platelets 169 10*3/mm3      Neutrophil % 58.5 %      Lymphocyte % 24.1 %      Monocyte % 16.6 %      Eosinophil % 0.2 %      Basophil % 0.4 %      Immature Grans % 0.2 %      Neutrophils, Absolute 2.79 10*3/mm3      Lymphocytes, Absolute 1.15 10*3/mm3      Monocytes, Absolute 0.79 10*3/mm3      Eosinophils, Absolute 0.01 10*3/mm3      Basophils, Absolute 0.02 10*3/mm3      Immature Grans, Absolute 0.01 10*3/mm3      nRBC 0.0 /100 WBC         Imaging Results (Last 24 Hours)     Procedure Component Value Units Date/Time    CT Soft Tissue Neck With Contrast [[id number]] Collected: 10/13/21 1003     Updated: 10/13/21 1028    Narrative:      Indication: Sore throat, stuffy nose, unable to drink and 8        DOSE LENGTH PRODUCT: 263 mGy cm. Automated exposure control was also  utilized to decrease patient radiation dose.     Technique: Axial CT of the neck after the uneventful administration of  Isovue iodinated contrast material. Sagittal and coronal reformations  were also provided for review.      Comparison: None     Findings:      The anterior and mid portions of the nasal cavity are patent, however,  there is complete attenuation of the posterior nasal cavity as well as a  complete attenuation of the nasopharyngeal airway extending down into  the oropharyngeal airway. In particular, there is an approximately 7.8 x  6.4 x 2.9 cm enhancing soft tissue mass which completely fills the  nasopharyngeal airway, extending anteriorly into the posterior nasal  cavity as well as inferiorly into the oropharyngeal airway (series  5-image [id number] and series 3-image 44). There is quite a bit of mass  effect, with expansion of the nasopharyngeal airway and displacement of  the  parapharyngeal fat. I do not see evidence of an infiltrative process  in regards to extension beyond the mucosal space or  involvement/destructive bony change along the skull base/clivus or into  the prevertebral soft tissues. This is in the region of the adenoid  tissue and could reflect a massive adenoid hypertrophy. I do not see any  discrete soft tissue abscess. More inferiorly, the tonsillar fossae and  base of tongue are unremarkable. The oropharyngeal and hypopharyngeal  airways remained patent. Laryngeal structures are unremarkable. The  subglottic airway is unremarkable.     Included intracranial contents are unremarkable. Orbital contents are  unremarkable. Chronic mucosal thickening identified throughout the  paranasal sinuses. Major salivary glands are unremarkable. No obvious  oral cavity lesion. Floor of mouth soft tissues are unremarkable. There  are no inflammatory changes identified in the deep neck beyond the  mucosal space. No suspicious cervical adenopathy. Cervical vascular  structures are patent. The thyroid gland is homogeneous. No acute bony  abnormality is seen. Lung apices are clear. Complete opacification of  the left mastoids with fluid in the left middle ear cavity as well.       Impression:      Impression:        1. There is an enhancing soft tissue mass which completely fills and  also expands the nasopharyngeal airway, extending anteriorly into the  posterior nasal cavity and extending inferiorly into the oropharyngeal  airway. My primary consideration is a massive adenoid hypertrophy  without abscess given the location and lack of aggressive feature. After  accounting for the size, which is quite impressive, there appears  circumscribed and there are no aggressive features such as evidence for  extension beyond the mucosal space or destructive bony change. Neoplasia  seems less likely. ENT follow-up and/or follow-up CT should be  considered.  2. Left mastoid and left middle ear  effusion which is likely secondary  to eustachian tube obstruction.  This report was finalized on 10/13/2021 10:25 by Dr Gaston Quezada, .        I have personally reviewed and interpreted the radiology studies and ECG obtained at time of admission.     Assessment / Plan     Assessment:   Active Hospital Problems    Diagnosis    • **Nasopharyngeal mass    • COVID-19      Plan:   Admit to medical floor  Vitals every 4 hours  Npo except ice chips and sips   IVF D5W NS KCL 20 mEq at 100 cc/hour  Empiric Rocephin/Vancomycin   ENT consult    Pain control > Tylenol > Morphine  Zofran 4 mg q6h prn N/V    Continue Dexamethasone with COVID dosing of 6 mg IVP daily  Otherwise COVID is mild     Code Status/Advanced Care Plan: Full    The patient's surrogate decision maker is wife, see records.     I discussed my findings and recommendations with the patient.    Estimated length of stay is over 2 midnights.     The patient was seen and examined by me on 10/13/2021 at 1430.    Electronically signed by Jhonny Campoverde MD, 10/13/21, 15:41 CDT.

## 2021-10-13 NOTE — ED PROVIDER NOTES
Subjective   History of Present Illness    Patient is a pleasant 30-year-old  gentleman who presents to ED with his wife who is a .  Chief complaint is worsening sore throat and painful swallowing.  His wife describes the symptoms first began about 2 weeks ago.  He had some nasal congestion and pain with swallowing particularly solid foods.  He noted white patches in the back of his throat.  When this first occurred, he did seek medical attention [place] [facility].  His wife describes that they checked for Covid, strep throat, and mono all which were negative.  He did receive a dose of IV antibiotics and steroids.  The patient reports he felt better and his throat pain seemed to have cleared up.  He was prescribed a week's course of antibiotics though they are unsure the name of these antibiotics.  Once he completed the medications at home, the patient reports his symptoms started to worsen bit by a bit over the past 1 week.  He had recurrent nasal congestion with worsening sore throat particularly again with solids.  He was able to eat some Mexican food yesterday at lunch though he chewed his food into fine bits.  Then he switched to purely Pedialyte.  The patient reports he is urinating.  Is unsure the coloration of his urine.  He has not eaten solids today.  He denies drooling but he does complain of pain with any type of swallowing.  He denies any measured fever.  He denies any neck pain.  Denies any pain with moving his head or neck.  His wife describes that their daughter had a fever 2 days ago but denies any other respiratory symptoms.  Patient denies any associated cough.  He is from [place] and is vaccinated.  She describes that he is otherwise healthy person.  He did have an episode of diarrhea yesterday but that subsided and was fleeting.    Review of Systems   Constitutional: Positive for activity change and appetite change. Negative for fever.   HENT: Positive for congestion, sinus pressure,  "sore throat, trouble swallowing and voice change. Negative for drooling.    Respiratory: Negative for cough and shortness of breath.    Cardiovascular: Negative.    Gastrointestinal: Negative.    Genitourinary: Negative.    Musculoskeletal: Negative.    Skin: Negative.    Neurological: Negative.    Psychiatric/Behavioral: Negative.    All other systems reviewed and are negative.      Past Medical History:   Diagnosis Date   • Tonsillitis        No Known Allergies    History reviewed. No pertinent surgical history.    History reviewed. No pertinent family history.    Social History     Socioeconomic History   • Marital status:    Tobacco Use   • Smoking status: Never Smoker   Substance and Sexual Activity   • Alcohol use: No   • Drug use: No       Prior to Admission medications    Medication Sig Start Date End Date Taking? Authorizing Provider   erythromycin (ROMYCIN) 5 MG/GM ophthalmic ointment Administer  to the right eye Every 4 (Four) Hours While Awake. 11/22/18   Tatyana Zendejas PA-C   HYDROcodone-acetaminophen (NORCO) 7.5-325 MG per tablet Take 1 tablet by mouth Every 4 (Four) Hours As Needed for Moderate Pain . 11/23/18   Jaki Baker MD       Medications   lactated ringers bolus 1,000 mL (0 mL Intravenous Stopped 10/13/21 0917)   vancomycin 2000 mg/500 mL 0.9% NS IVPB ([facility]) (0 mg Intravenous Stopped 10/13/21 1111)   dexamethasone (DECADRON) injection 10 mg (10 mg Intravenous Given 10/13/21 0847)   iopamidol (ISOVUE-300) 61 % injection 100 mL (100 mL Intravenous Given 10/13/21 1000)       /73   Pulse 88   Temp 98.4 °F (36.9 °C) (Oral)   Resp 16   Ht 182.9 cm (72\")   Wt 78 kg (172 lb)   SpO2 97%   BMI 23.33 kg/m²       Objective   Physical Exam  Vitals reviewed.   Constitutional:       General: He is not in acute distress.     Appearance: He is well-developed. He is not ill-appearing, toxic-appearing or diaphoretic.   HENT:      Head:      Jaw: There is normal jaw occlusion. No " tenderness, swelling or pain on movement.      Salivary Glands: Right salivary gland is not diffusely enlarged or tender. Left salivary gland is not diffusely enlarged or tender.      Comments: Patient does have potato voice     Right Ear: There is impacted cerumen.      Left Ear: There is impacted cerumen.      Mouth/Throat:      Mouth: Mucous membranes are dry.      Pharynx: Uvula midline. Pharyngeal swelling, oropharyngeal exudate, posterior oropharyngeal erythema and uvula swelling present.      Tonsils: Tonsillar exudate and tonsillar abscess present. 4+ on the left.      Comments: The patient's left tonsil was large and covered in exudate.  There is some moderate swelling on the left posterior pharynx extending into the entirety of the left side soft palate.  His uvula is midline.  There is some swelling on the right side but not as significant as the left side.  He is tolerating his secretions.  There is no drooling or stridor.  He is able to swallow.  Neck:      Thyroid: No thyroid mass, thyromegaly or thyroid tenderness.      Trachea: No abnormal tracheal secretions.   Musculoskeletal:      Cervical back: Full passive range of motion without pain, normal range of motion and neck supple. No edema, erythema, signs of trauma, rigidity, torticollis or crepitus. No pain with movement. Normal range of motion.   Neurological:      Mental Status: He is alert.         Procedures         Lab Results (last 24 hours)     Procedure Component Value Units Date/Time    Mononucleosis Screen [[id number]]  (Normal) Collected: 10/13/21 0843    Specimen: Blood Updated: 10/13/21 0906     Monospot Negative    CBC & Differential [[id number]]  (Abnormal) Collected: 10/13/21 0843    Specimen: Blood Updated: 10/13/21 0859    Narrative:      The following orders were created for panel order CBC & Differential.  Procedure                               Abnormality         Status                     ---------                                -----------         ------                     CBC Auto Differential[[id number]]        Abnormal            Final result                 Please view results for these tests on the individual orders.    Comprehensive Metabolic Panel [[id number]]  (Abnormal) Collected: 10/13/21 0843    Specimen: Blood Updated: 10/13/21 0924     Glucose 120 mg/dL      BUN 7 mg/dL      Creatinine 0.68 mg/dL      Sodium 136 mmol/L      Potassium 3.8 mmol/L      Chloride 97 mmol/L      CO2 32.0 mmol/L      Calcium 9.0 mg/dL      Total Protein 7.3 g/dL      Albumin 4.50 g/dL      ALT (SGPT) 16 U/L      AST (SGOT) 37 U/L      Alkaline Phosphatase 93 U/L      Total Bilirubin 0.3 mg/dL      eGFR Non African Amer 139 mL/min/1.73      Globulin 2.8 gm/dL      A/G Ratio 1.6 g/dL      BUN/Creatinine Ratio 10.3     Anion Gap 7.0 mmol/L     Narrative:      GFR Normal >60  Chronic Kidney Disease <60  Kidney Failure <15      Blood Culture - Blood, Arm, Left [[id number]] Collected: 10/13/21 0843    Specimen: Blood from Arm, Left Updated: 10/13/21 0947    Blood Culture - Blood, Arm, Right [[id number]] Collected: 10/13/21 0843    Specimen: Blood from Arm, Right Updated: 10/13/21 0947    CBC Auto Differential [[id number]]  (Abnormal) Collected: 10/13/21 0843    Specimen: Blood Updated: 10/13/21 0859     WBC 4.77 10*3/mm3      RBC 4.90 10*6/mm3      Hemoglobin 14.5 g/dL      Hematocrit 43.3 %      MCV 88.4 fL      MCH 29.6 pg      MCHC 33.5 g/dL      RDW 12.4 %      RDW-SD 40.4 fl      MPV 10.0 fL      Platelets 169 10*3/mm3      Neutrophil % 58.5 %      Lymphocyte % 24.1 %      Monocyte % 16.6 %      Eosinophil % 0.2 %      Basophil % 0.4 %      Immature Grans % 0.2 %      Neutrophils, Absolute 2.79 10*3/mm3      Lymphocytes, Absolute 1.15 10*3/mm3      Monocytes, Absolute 0.79 10*3/mm3      Eosinophils, Absolute 0.01 10*3/mm3      Basophils, Absolute 0.02 10*3/mm3      Immature Grans, Absolute 0.01 10*3/mm3      nRBC 0.0 /100 WBC     Rapid Strep A Screen -  Swab, Throat [[id number]]  (Normal) Collected: 10/13/21 0856    Specimen: Swab from Throat Updated: 10/13/21 0925     Strep A Ag Negative    Beta Strep Culture, Throat - Swab, Throat [[id number]] Collected: 10/13/21 0856    Specimen: Swab from Throat Updated: 10/13/21 0925    COVID PRE-OP / PRE-PROCEDURE SCREENING ORDER (NO ISOLATION) - Swab, Nasal Cavity [[id number]]  (Abnormal) Collected: 10/13/21 0857    Specimen: Swab from Nasal Cavity Updated: 10/13/21 0957    Narrative:      The following orders were created for panel order COVID PRE-OP / PRE-PROCEDURE SCREENING ORDER (NO ISOLATION) - Swab, Nasal Cavity.  Procedure                               Abnormality         Status                     ---------                               -----------         ------                     COVID-19,[facility]...[[id number]]  Abnormal            Final result                 Please view results for these tests on the individual orders.    COVID-19,[facility] IN-HOUSE,Nasal Swab No Transport Media 3-4 HR TAT - Swab, Nasal Cavity [[id number]]  (Abnormal) Collected: 10/13/21 0857    Specimen: Swab from Nasal Cavity Updated: 10/13/21 0957     COVID19 Detected    Narrative:      Fact sheet for providers: https://www.fda.gov/media/102967/download     Fact sheet for patients: https://www.fda.gov/media/000509/download    Test performed by PCR.    Consider negative results in combination with clinical observations, patient history, and epidemiological information.          CT Soft Tissue Neck With Contrast    Result Date: 10/13/2021  Narrative: Indication: Sore throat, stuffy nose, unable to drink and 8    DOSE LENGTH PRODUCT: 263 mGy cm. Automated exposure control was also utilized to decrease patient radiation dose.  Technique: Axial CT of the neck after the uneventful administration of Isovue iodinated contrast material. Sagittal and coronal reformations were also provided for review.  Comparison: None  Findings:  The anterior and  mid portions of the nasal cavity are patent, however, there is complete attenuation of the posterior nasal cavity as well as a complete attenuation of the nasopharyngeal airway extending down into the oropharyngeal airway. In particular, there is an approximately 7.8 x 6.4 x 2.9 cm enhancing soft tissue mass which completely fills the nasopharyngeal airway, extending anteriorly into the posterior nasal cavity as well as inferiorly into the oropharyngeal airway (series 5-image [phone] and series 3-image 44). There is quite a bit of mass effect, with expansion of the nasopharyngeal airway and displacement of the parapharyngeal fat. I do not see evidence of an infiltrative process in regards to extension beyond the mucosal space or involvement/destructive bony change along the skull base/clivus or into the prevertebral soft tissues. This is in the region of the adenoid tissue and could reflect a massive adenoid hypertrophy. I do not see any discrete soft tissue abscess. More inferiorly, the tonsillar fossae and base of tongue are unremarkable. The oropharyngeal and hypopharyngeal airways remained patent. Laryngeal structures are unremarkable. The subglottic airway is unremarkable.  Included intracranial contents are unremarkable. Orbital contents are unremarkable. Chronic mucosal thickening identified throughout the paranasal sinuses. Major salivary glands are unremarkable. No obvious oral cavity lesion. Floor of mouth soft tissues are unremarkable. There are no inflammatory changes identified in the deep neck beyond the mucosal space. No suspicious cervical adenopathy. Cervical vascular structures are patent. The thyroid gland is homogeneous. No acute bony abnormality is seen. Lung apices are clear. Complete opacification of the left mastoids with fluid in the left middle ear cavity as well.      Impression: Impression:    1. There is an enhancing soft tissue mass which completely fills and also expands the  nasopharyngeal airway, extending anteriorly into the posterior nasal cavity and extending inferiorly into the oropharyngeal airway. My primary consideration is a massive adenoid hypertrophy without abscess given the location and lack of aggressive feature. After accounting for the size, which is quite impressive, there appears circumscribed and there are no aggressive features such as evidence for extension beyond the mucosal space or destructive bony change. Neoplasia seems less likely. ENT follow-up and/or follow-up CT should be considered. 2. Left mastoid and left middle ear effusion which is likely secondary to eustachian tube obstruction. This report was finalized on 10/13/2021 10:25 by Dr Gaston Quezada, .       ED Course  ED Course as of 10/13/21 1129   Wed Oct 13, 2021   0827 With the patient's presentation, I am concerned for peritonsillar abscess versus other.  I did review his case immediately with Dr. Hale who has examined the patient as well.  I had spoken with Dr. Ruiz, otolaryngologist on call.  He wishes to proceed with a CT scan of the patient's neck as well as administering vancomycin and Decadron.  We will speak with Dr. Dorado after the studies and closely observe the patient.  This has been relayed to the patient and his wife. [TK]   1000 I did speak with Dr. Dorado about the patient's abnormal CT scan.  Because patient has Covid, he request patient be admitted under medicine service and he will be consulted.  I have a call to hospitalist at this time. [TK]   1128 I did speak with Dr. Burrell, hospitalist.  He is agreeable to meet the patient under their services and consult Dr. Dorado.  This has been educated the patient and his wife. [TK]      ED Course User Index  [TK] Alexandra Ennis PA          [facility]    Final diagnoses:   Nasopharyngeal mass   COVID          Alexandra Ennis PA  10/13/21 1129

## 2021-10-13 NOTE — CONSULTS
[facility] Otolaryngology Head and Neck Surgery  CONSULT  Patient Name: Paras Bronson  : 1990  MRN: [id number]  Primary Care Physician:  Provider, No Known  Referring Physician: No ref. provider found  Date of admission: 10/13/2021  Length of Stay: 0  Room: 390/1      Subjective    Subjective   History of Present Illness  Chief Complaint/Reason for Consultation: Nasopharyngeal mass  Accompanied by: No one  Paras Bronson is a 30 y.o. male who was admitted with Covid and nasopharyngeal mass.  There is a language barrier.  The patient does speak some English.  I plan to return to complete history.  The patient says he has been sick for approximately 2 weeks.  He has had extremely sore throat and difficulty swallowing.  He has been treated with antibiotics in the past.  He says antibiotics and steroids did help.  Patient is seen, chart is reviewed    Review of Systems   Unable to perform ROS: Other   All other systems reviewed and are negative.  Otherwise healthy but language barrier prevents full history  Otherwise complete ROS is negative except as mentioned above.    Past Medical History:   Past Medical History:   Diagnosis Date   • Tonsillitis      Past Surgical History:History reviewed. No pertinent surgical history.    Family History: His family history is not on file.   Social History: He  reports that he has never smoked. He does not have any smokeless tobacco history on file. He reports that he does not drink alcohol and does not use drugs.    Home Medications:  HYDROcodone-acetaminophen and erythromycin    Allergies:  He has No Known Allergies.    Objective    Objective   Vitals:    Temp:  [98 °F (36.7 °C)-98.4 °F (36.9 °C)] 98 °F (36.7 °C)  Heart Rate:  [] 70  Resp:  [12-20] 16  BP: (110-129)/(72-80) 118/72    Physical Exam  Vitals reviewed.   Constitutional:       Appearance: Normal appearance. He is well-developed.   HENT:      Head: Normocephalic  and atraumatic.      Right Ear: External ear normal.      Left Ear: External ear normal.      Nose: Nose normal.   Eyes:      Extraocular Movements: Extraocular movements intact.      Conjunctiva/sclera: Conjunctivae normal.   Pulmonary:      Effort: Pulmonary effort is normal. No respiratory distress.      Breath sounds: No stridor.   Musculoskeletal:         General: Normal range of motion.      Cervical back: Normal range of motion.   Skin:     Findings: No rash.   Neurological:      General: No focal deficit present.      Mental Status: He is alert.      Cranial Nerves: No cranial nerve deficit.   Psychiatric:         Behavior: Behavior normal.         Thought Content: Thought content normal.         Judgment: Judgment normal.         Result Review    Result Review:  I have personally reviewed the results from the time of this admission to 10/13/2021 18:55 CDT and agree with these findings:  [x]  Laboratory  []  Microbiology  [x]  Radiology  []  EKG/Telemetry   []  Cardiology/Vascular   []  Pathology  [x]  Old records  []  Other:  Most notable findings include: No elevated white Count, strep labs negative, no electrolyte abnormalities  CT scan reviewed and agree with radiology interpretation  H&P by Jhonny Campoverde MD (10/13/2021 15:41)   ED Provider Notes by Alexandra Ennis PA (10/13/2021 08:17)   Mononucleosis Screen (10/13/2021 08:43)   CBC & Differential (10/13/2021 08:43)   Comprehensive Metabolic Panel (10/13/2021 08:43)   Rapid Strep A Screen - Swab, Throat (10/13/2021 08:56)   Beta Strep Culture, Throat - Swab, Throat (10/13/2021 08:56)   CT Soft Tissue Neck With Contrast (10/13/2021 10:00)       Assessment/Plan    Assessment / Plan   Brief Patient Summary:  Paras Bronson is a 30 y.o. male with acute tonsillitis type symptoms.  He was noted to have a mass in his nasopharynx upon presentation to the emergency room.  He does not have any peritonsillar abscess.  He was also  Covid positive.  He is now admitted for evaluation and treatment of mass and Covid positivity.    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  He appears to have no evidence of tonsillitis.  I suspect his Covid status is responsible for his symptoms.  His adenoid mass require biopsy.  I am concerned about juvenile angiofibroma.  I will evaluate this with the radiologist and see if further imaging is dictated.  • Covid infection-I feel this is causing his symptoms of fatigue.  He appears to have a mild case of Covid.  Followed by primary team.    I discussed the patient's findings and my recommendations with patient, although there is a language barrier.  Following patient as in-patient. Further recommendations will be made based on serial examinations.    Medications/Orders for this encounter: No new medications ordered.  No New orders written..        DVT prophylaxis:  Mechanical DVT prophylaxis orders are present.    Discharge Planning: Per primary team    Electronically signed by Clayton Dorado Jr, MD, 10/13/21, 6:55 PM CDT.

## 2021-10-14 LAB
ANION GAP SERPL CALCULATED.3IONS-SCNC: 6 MMOL/L (ref 5–15)
BUN SERPL-MCNC: 10 MG/DL (ref 6–20)
BUN/CREAT SERPL: 22.7 (ref 7–25)
CALCIUM SPEC-SCNC: 8.9 MG/DL (ref 8.6–10.5)
CHLORIDE SERPL-SCNC: 101 MMOL/L (ref 98–107)
CO2 SERPL-SCNC: 32 MMOL/L (ref 22–29)
CREAT SERPL-MCNC: 0.44 MG/DL (ref 0.76–1.27)
DEPRECATED RDW RBC AUTO: 40.1 FL (ref 37–54)
ERYTHROCYTE [DISTWIDTH] IN BLOOD BY AUTOMATED COUNT: 12.2 % (ref 12.3–15.4)
GFR SERPL CREATININE-BSD FRML MDRD: >150 ML/MIN/1.73
GIANT PLATELETS: ABNORMAL
GLUCOSE SERPL-MCNC: 110 MG/DL (ref 65–99)
HCT VFR BLD AUTO: 39.7 % (ref 37.5–51)
HGB BLD-MCNC: 13.6 G/DL (ref 13–17.7)
LYMPHOCYTES # BLD MANUAL: 1.75 10*3/MM3 (ref 0.7–3.1)
LYMPHOCYTES NFR BLD MANUAL: 10.1 % (ref 5–12)
LYMPHOCYTES NFR BLD MANUAL: 29.3 % (ref 19.6–45.3)
MCH RBC QN AUTO: 30.5 PG (ref 26.6–33)
MCHC RBC AUTO-ENTMCNC: 34.3 G/DL (ref 31.5–35.7)
MCV RBC AUTO: 89 FL (ref 79–97)
MONOCYTES # BLD AUTO: 0.5 10*3/MM3 (ref 0.1–0.9)
NEUTROPHILS # BLD AUTO: 2.69 10*3/MM3 (ref 1.7–7)
NEUTROPHILS NFR BLD MANUAL: 47.5 % (ref 42.7–76)
NEUTS BAND NFR BLD MANUAL: 7.1 % (ref 0–5)
PLATELET # BLD AUTO: 161 10*3/MM3 (ref 140–450)
PMV BLD AUTO: 10.3 FL (ref 6–12)
POTASSIUM SERPL-SCNC: 4.1 MMOL/L (ref 3.5–5.2)
RBC # BLD AUTO: 4.46 10*6/MM3 (ref 4.14–5.8)
RBC MORPH BLD: NORMAL
SODIUM SERPL-SCNC: 139 MMOL/L (ref 136–145)
VANCOMYCIN TROUGH SERPL-MCNC: 6.2 MCG/ML (ref 5–20)
VARIANT LYMPHS NFR BLD MANUAL: 6.1 % (ref 0–5)
WBC # BLD AUTO: 4.94 10*3/MM3 (ref 3.4–10.8)
WBC MORPH BLD: NORMAL

## 2021-10-14 PROCEDURE — 25010000002 DEXAMETHASONE PER 1 MG: Performed by: FAMILY MEDICINE

## 2021-10-14 PROCEDURE — 80202 ASSAY OF VANCOMYCIN: CPT | Performed by: FAMILY MEDICINE

## 2021-10-14 PROCEDURE — 25010000002 VANCOMYCIN 1 G RECONSTITUTED SOLUTION 1 EACH VIAL: Performed by: FAMILY MEDICINE

## 2021-10-14 PROCEDURE — 85007 BL SMEAR W/DIFF WBC COUNT: CPT | Performed by: FAMILY MEDICINE

## 2021-10-14 PROCEDURE — 25010000002 VANCOMYCIN 10 G RECONSTITUTED SOLUTION: Performed by: FAMILY MEDICINE

## 2021-10-14 PROCEDURE — 99233 SBSQ HOSP IP/OBS HIGH 50: CPT | Performed by: OTOLARYNGOLOGY

## 2021-10-14 PROCEDURE — 85027 COMPLETE CBC AUTOMATED: CPT | Performed by: FAMILY MEDICINE

## 2021-10-14 PROCEDURE — 80048 BASIC METABOLIC PNL TOTAL CA: CPT | Performed by: FAMILY MEDICINE

## 2021-10-14 PROCEDURE — 25010000002 CEFTRIAXONE PER 250 MG: Performed by: FAMILY MEDICINE

## 2021-10-14 RX ADMIN — VANCOMYCIN HYDROCHLORIDE 1000 MG: 1 INJECTION, POWDER, LYOPHILIZED, FOR SOLUTION INTRAVENOUS at 04:48

## 2021-10-14 RX ADMIN — Medication 10 ML: at 21:29

## 2021-10-14 RX ADMIN — Medication 10 ML: at 09:34

## 2021-10-14 RX ADMIN — SODIUM CHLORIDE 1 G: 9 INJECTION, SOLUTION INTRAVENOUS at 21:29

## 2021-10-14 RX ADMIN — VANCOMYCIN HYDROCHLORIDE 1250 MG: 10 INJECTION, POWDER, LYOPHILIZED, FOR SOLUTION INTRAVENOUS at 23:34

## 2021-10-14 RX ADMIN — VANCOMYCIN HYDROCHLORIDE 1500 MG: 10 INJECTION, POWDER, LYOPHILIZED, FOR SOLUTION INTRAVENOUS at 15:04

## 2021-10-14 RX ADMIN — POTASSIUM CHLORIDE, DEXTROSE MONOHYDRATE AND SODIUM CHLORIDE 100 ML/HR: 150; 5; 450 INJECTION, SOLUTION INTRAVENOUS at 06:40

## 2021-10-14 RX ADMIN — DEXAMETHASONE SODIUM PHOSPHATE 6 MG: 4 INJECTION, SOLUTION INTRA-ARTICULAR; INTRALESIONAL; INTRAMUSCULAR; INTRAVENOUS; SOFT TISSUE at 09:34

## 2021-10-14 NOTE — PROGRESS NOTES
"Pharmacy Dosing Service  Antimicrobial  Vancomycin    Assessment/Plan:  Current dosage: Vancomycin 1000 mg IVPB every 8 hours  Indication: SSTI  Start date: 10/13/21  Current end date: 10/18/21    Other antimicrobial agent(s): ceftriaxone    Level(s): 10/14/21 trough = 6.2 mcg/mL @12:18, ~7.5 hours post-dose 1000 mg    Dosage adjusted based on pharmacokinetic model data: 1500 mg IV x 1, followed by 1250 mg IV every 8 hours    Vancomycin dose: 1250 mg IV Q8hrs (infused over 1.5 hrs)  Estimated AUC/ALENA: 481 mcg*hr/mL (assumed ALENA 1 mcg/mL)  Estimated peak: 27.3 mcg/mL  Estimated trough: 14 mcg/mL  Therapeutic target: AUC/ALENA 400 to 600 mcg*hr/mL    Pharmacy will continue to follow daily.     Subjective:  Paras Bronson is a 30 y.o. male admitted 10/13/2021  8:02 AM.    (J39.2) Nasopharyngeal mass    (U07.1) COVID    Anti-Infectives (From admission, onward)      Ordered     Dose/Rate Route Frequency Start Stop    10/14/21 1344  vancomycin 1250 mg/250 mL 0.9% NS IVPB ([facility])        Ordering Provider: Jhonny Campoverde MD    15 mg/kg × 78 kg  over 90 Minutes Intravenous Every 8 Hours Scheduled 10/14/21 1430 10/18/21 1359    10/13/21 1610  cefTRIAXone (ROCEPHIN) 1 g in sodium chloride 0.9 % 100 mL IVPB-VTB        Ordering Provider: Jhonny Campoverde MD    1 g  200 mL/hr over 30 Minutes Intravenous Every 24 Hours 10/13/21 1700 10/18/21 1959    10/13/21 0826  vancomycin 2000 mg/500 mL 0.9% NS IVPB ([facility])        Ordering Provider: Alexandra Ennis PA    2,000 mg Intravenous Once 10/13/21 0828 10/13/21 1111            Past Medical / Surgical / Social History reviewed.     Objective:  Ht: 182.9 cm (72\"); Wt: 78 kg (172 lb) Body mass index is 23.33 kg/m².  Estimated Creatinine Clearance: >100 mL/min (A) (by C-G formula based on SCr of 0.44 mg/dL (L)).    BUN   Date Value Ref Range Status   10/14/2021 10 6 - 20 mg/dL Final   10/13/2021 7 6 - 20 mg/dL Final      Creatinine   Date Value Ref Range " Status   10/14/2021 0.44 (L) 0.76 - 1.27 mg/dL Final   10/13/2021 0.68 (L) 0.76 - 1.27 mg/dL Final      Lab Results   Component Value Date    WBC 4.94 10/14/2021    WBC 4.77 10/13/2021      Lab Results   Component Value Date    ALBUMIN 4.50 10/13/2021       Lab Results   Component Value Date    VANCOTROUGH 6.20 10/14/2021         Culture Results:  Microbiology Results (last 10 days)       Procedure Component Value - Date/Time    COVID PRE-OP / PRE-PROCEDURE SCREENING ORDER (NO ISOLATION) - Swab, Nasal Cavity [[id number]]  (Abnormal) Collected: 10/13/21 0857    Lab Status: Final result Specimen: Swab from Nasal Cavity Updated: 10/13/21 0957    Narrative:      The following orders were created for panel order COVID PRE-OP / PRE-PROCEDURE SCREENING ORDER (NO ISOLATION) - Swab, Nasal Cavity.  Procedure                               Abnormality         Status                     ---------                               -----------         ------                     COVID-19,[facility]...[[id number]]  Abnormal            Final result                 Please view results for these tests on the individual orders.    COVID-19,[facility] IN-HOUSE,Nasal Swab No Transport Media 3-4 HR TAT - Swab, Nasal Cavity [[id number]]  (Abnormal) Collected: 10/13/21 0857    Lab Status: Final result Specimen: Swab from Nasal Cavity Updated: 10/13/21 0957     COVID19 Detected    Narrative:      Fact sheet for providers: https://www.fda.gov/media/633526/download     Fact sheet for patients: https://www.fda.gov/media/211922/download    Test performed by PCR.    Consider negative results in combination with clinical observations, patient history, and epidemiological information.    Rapid Strep A Screen - Swab, Throat [[id number]]  (Normal) Collected: 10/13/21 0856    Lab Status: Final result Specimen: Swab from Throat Updated: 10/13/21 0925     Strep A Ag Negative    Beta Strep Culture, Throat - Swab, Throat [[id number]]  (Normal) Collected:  10/13/21 0856    Lab Status: Preliminary result Specimen: Swab from Throat Updated: 10/14/21 0712     Throat Culture, Beta Strep No Beta Hemolytic Streptococcus Isolated    Narrative:      Group A Strep incidence is low in adults. Positive culture for Beta hemolytic Streptococcus species can reflect colonization and not true infection. Please correlate clinically.    Blood Culture - Blood, Arm, Left [[id number]]  (Normal) Collected: 10/13/21 0843    Lab Status: Preliminary result Specimen: Blood from Arm, Left Updated: 10/14/21 1000     Blood Culture No growth at 24 hours    Blood Culture - Blood, Arm, Right [[id number]]  (Normal) Collected: 10/13/21 0843    Lab Status: Preliminary result Specimen: Blood from Arm, Right Updated: 10/14/21 1000     Blood Culture No growth at 24 hours            Jorge Willett, PharmD  10/14/21 13:48 CDT

## 2021-10-14 NOTE — PLAN OF CARE
Goal Outcome Evaluation:  Plan of Care Reviewed With: patient        Progress: improving  Outcome Summary: No c/o pain. Pt reports that he feels better today. IVF. IV abx. IV steriods. NPO except icechips. Voiding. BM today. Sats have been in the 90s on RA. VSS.

## 2021-10-14 NOTE — PROGRESS NOTES
[facility] Otolaryngology Head and Neck Surgery  INPATIENT PROGRESS NOTE    Patient Name: Paras Bronson  : 1990   MRN: [id number]  Date of Admission: 10/13/2021  Today's Date: 10/14/21  Length of Stay: 1  390/1    NiraliSereneno Christian,*   Primary Care Physician: Provider, No Known  Surgical Procedures Since Admission:    Subjective   Subjective   Chief Complaint: Nasopharyngeal mass  HPI   Accompanied by: No one  Since last examined, Paras Bronson has remained stable.  He remains in isolation for Covid infection.  He still has significant nasal pharyngeal obstruction.  He still has significant throat pain.  Patient seen, chart is reviewed    Review of Systems   No change from prior inquiry  All pertinent negatives and positives are as above. All other systems have been reviewed and are negative unless otherwise stated.   Objective   Objective   Vitals:   Temp:  [95.1 °F (35.1 °C)-98 °F (36.7 °C)] 96.2 °F (35.7 °C)  Heart Rate:  [63-85] 68  Resp:  [14-18] 18  BP: (100-118)/(58-78) 100/58    Physical Exam  Vitals reviewed.   Constitutional:       Appearance: Normal appearance. He is well-developed and normal weight.   HENT:      Head: Normocephalic and atraumatic.      Right Ear: External ear normal.      Left Ear: External ear normal.      Nose: Nose normal. No nasal deformity or septal deviation.      Left Nostril: Occlusion (Bilateral) present.      Mouth/Throat:      Mouth: Mucous membranes are dry. No oral lesions.      Dentition: Normal dentition. No gum lesions.      Tongue: No lesions. Tongue does not deviate from midline.      Palate: Mass (Posterior to the palate, adherent to the posterior pharyngeal wall) present.      Pharynx: No uvula swelling.      Tonsils: No tonsillar exudate. 2+ on the right. 2+ on the left.        Comments: Tonsils-deep and cryptic, no exudate, not contiguous with mass in the superior oropharynx  Eyes:      General: Lids are  normal. Gaze aligned appropriately.      Extraocular Movements: Extraocular movements intact.      Conjunctiva/sclera: Conjunctivae normal.      Comments: Color-brown   Neck:      Thyroid: No thyroid mass or thyromegaly.      Comments: Voice-hyponasal  Pulmonary:      Effort: Pulmonary effort is normal. No tachypnea or respiratory distress.      Breath sounds: No stridor.   Musculoskeletal:         General: Normal range of motion.      Cervical back: Normal range of motion.   Lymphadenopathy:      Cervical: No cervical adenopathy.   Skin:     Findings: No rash.   Neurological:      General: No focal deficit present.      Mental Status: He is alert and oriented to person, place, and time.      Cranial Nerves: Cranial nerves are intact. No cranial nerve deficit.   Psychiatric:         Attention and Perception: Attention and perception normal.         Mood and Affect: Mood and affect normal.         Speech: Speech normal.         Behavior: Behavior normal. Behavior is cooperative.         Thought Content: Thought content normal.         Cognition and Memory: Cognition and memory normal.         Judgment: Judgment normal.           Result Review    Result Review:  I have personally reviewed the results from the time of this admission to 10/14/2021 13:35 CDT and agree with these findings:  [x]  Laboratory  []  Microbiology  []  Radiology  []  EKG/Telemetry   []  Cardiology/Vascular   []  Pathology  [x]  Old records  []  Other:  Most notable findings include: No elevated white count, but elevated bands on differential.  Progress Notes by Jhonny Campoverde MD (10/14/2021 12:01)   CBC & Differential (10/14/2021 06:47)   Basic Metabolic Panel (10/14/2021 06:47)       Assessment/Plan   Assessment / Plan   Brief Patient Summary:  Paras Bronson is a 30 y.o. male who has remained ill.  He is in Covid isolation at this point time.  I do not feel the patient has tonsillitis.  I have palpated the mass and feel that  it is not contiguous with the tonsils at all.  The tonsils appear to be mildly red, there is no pus.  Covid may be causing his current symptoms.  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  I feel he needs a biopsy.  However, he needs to come out of Covid isolation.  I see no erosion.  I am still concerned for possible lymphoma.  He should continue steroids at this point time.    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  I do not feel he has acute tonsillitis.  I feel he has a respiratory tract infection caused by Covid.  I recommend continuing steroids for his nasopharyngeal mass.  • Covid infection-the patient continue steroids.  This should help his nasopharyngeal mass.  I feel this is lymphocytic.  I cannot rule out malignancy at this point time.  He appears to have no bony erosion.  Discussed Plan with Dr. Campoverde, patient  Following patient as in-patient. Further recommendations will be made based on serial examinations.    Medications/Orders for this encounter: No new medications ordered.  No New orders written.      Discharge Plannin to 2 days if the patient remained stable from a Covid standpoint.  I feel from an ENT standpoint the patient can be discharged home to continue recovery.  As soon as he is free from Covid isolation, I will plan surgery for a biopsy.        DVT prophylaxis:  Mechanical DVT prophylaxis orders are present.     Electronically signed by Clayton Dorado Jr, MD, 10/14/21, 1:35 PM CDT.

## 2021-10-14 NOTE — PLAN OF CARE
Goal Outcome Evaluation:  Plan of Care Reviewed With: patient        Progress: no change  Outcome Summary: COVID + pt ;  speaks small amount of English;  alert and oriented;  up ad radha;  voiding in BR;  NPO except for ice chips and sips with meds;  RAC Iv site;  Abx and IVF ;  VSS;  safety maintained

## 2021-10-14 NOTE — PROGRESS NOTES
[facility] Medicine Services  INPATIENT PROGRESS NOTE    Patient Name: Paras Bronson  Date of Admission: 10/13/2021  Today's Date: 10/14/21  Length of Stay: 1  Primary Care Physician: Provider, No Known    Subjective   Chief Complaint: Throat pain  HPI   Patient up and about. Tolerating oral intake. No fever.    Review of Systems   All pertinent negatives and positives are as above. All other systems have been reviewed and are negative unless otherwise stated.     Objective    Temp:  [95.1 °F (35.1 °C)-98 °F (36.7 °C)] 95.1 °F (35.1 °C)  Heart Rate:  [63-88] 80  Resp:  [12-18] 18  BP: (100-118)/(64-80) 100/64  Physical Exam  Constitutional:       Appearance: He is well-developed. He is not ill-appearing.   HENT:      Head: Normocephalic and atraumatic.      Right Ear: External ear normal.      Left Ear: External ear normal.      Nose: Nose normal.      Mouth/Throat:      Mouth: Mucous membranes are dry.   Eyes:      General:         Right eye: No discharge.         Left eye: No discharge.      Extraocular Movements: Extraocular movements intact.      Conjunctiva/sclera: Conjunctivae normal.      Pupils: Pupils are equal, round, and reactive to light.   Neck:      Vascular: No JVD.   Cardiovascular:      Rate and Rhythm: Normal rate and regular rhythm.      Heart sounds: Normal heart sounds. No murmur heard.      Pulmonary:      Effort: Pulmonary effort is normal. No respiratory distress.      Breath sounds: Normal breath sounds. No wheezing or rales.   Chest:      Chest wall: No tenderness.   Abdominal:      General: Bowel sounds are normal. There is no distension.      Palpations: Abdomen is soft.      Tenderness: There is no abdominal tenderness. There is no guarding or rebound.   Musculoskeletal:         General: No tenderness or deformity. Normal range of motion.      Cervical back: Normal range of motion and neck supple. No rigidity.      Right lower leg: No edema.       Left lower leg: No edema.   Skin:     General: Skin is warm and dry.      Findings: No rash.   Neurological:      General: No focal deficit present.      Mental Status: He is alert and oriented to person, place, and time. Mental status is at baseline.      Cranial Nerves: No cranial nerve deficit.      Sensory: No sensory deficit.      Motor: No abnormal muscle tone.      Deep Tendon Reflexes: Reflexes normal.   Psychiatric:         Mood and Affect: Mood normal.         Behavior: Behavior normal.     Results Review:  I have reviewed the labs, radiology results, and diagnostic studies.    Laboratory Data:   Results from last 7 days   Lab Units 10/14/21  0647 10/13/21  0843   WBC 10*3/mm3 4.94 4.77   HEMOGLOBIN g/dL 13.6 14.5   HEMATOCRIT % 39.7 43.3   PLATELETS 10*3/mm3 161 169        Results from last 7 days   Lab Units 10/14/21  0647 10/13/21  0843   SODIUM mmol/L 139 136   POTASSIUM mmol/L 4.1 3.8   CHLORIDE mmol/L 101 97*   CO2 mmol/L 32.0* 32.0*   BUN mg/dL 10 7   CREATININE mg/dL 0.44* 0.68*   CALCIUM mg/dL 8.9 9.0   BILIRUBIN mg/dL  --  0.3   ALK PHOS U/L  --  93   ALT (SGPT) U/L  --  16   AST (SGOT) U/L  --  37   GLUCOSE mg/dL 110* 120*       Culture Data:   Blood Culture   Date Value Ref Range Status   10/13/2021 No growth at 24 hours  Preliminary   10/13/2021 No growth at 24 hours  Preliminary       Radiology Data:   Imaging Results (Last 24 Hours)     ** No results found for the last 24 hours. **          I have reviewed the patient's current medications.     Assessment/Plan     Active Hospital Problems    Diagnosis    • **Nasopharyngeal mass    • COVID-19        Case discussed with Dr Dorado. His impression is of a nasopharyngeal mass with necrosis and not tonsillitis. Considering biopsy non urgently post COVID resolution.  COVID is mild. Continue Dexamethasone for swelling and pain control.   Home soon.      Discharge Planning: I expect the patient to be discharged to 10/14/2021 in 12 PM  days.    Electronically signed by Jhonny Campoverde MD, 10/14/21, 12:01 CDT.

## 2021-10-15 ENCOUNTER — READMISSION MANAGEMENT (OUTPATIENT)
Dept: CALL CENTER | Facility: HOSPITAL | Age: 31
End: 2021-10-15

## 2021-10-15 VITALS
BODY MASS INDEX: 23.3 KG/M2 | OXYGEN SATURATION: 95 % | DIASTOLIC BLOOD PRESSURE: 68 MMHG | WEIGHT: 172 LBS | TEMPERATURE: 96.6 F | SYSTOLIC BLOOD PRESSURE: 100 MMHG | RESPIRATION RATE: 16 BRPM | HEIGHT: 72 IN | HEART RATE: 68 BPM

## 2021-10-15 LAB — BACTERIA SPEC AEROBE CULT: NORMAL

## 2021-10-15 PROCEDURE — 25010000002 VANCOMYCIN 10 G RECONSTITUTED SOLUTION: Performed by: FAMILY MEDICINE

## 2021-10-15 PROCEDURE — 25010000002 DEXAMETHASONE PER 1 MG: Performed by: FAMILY MEDICINE

## 2021-10-15 RX ORDER — DEXAMETHASONE 6 MG/1
6 TABLET ORAL
Qty: 7 TABLET | Refills: 0 | Status: SHIPPED | OUTPATIENT
Start: 2021-10-15 | End: 2021-10-22

## 2021-10-15 RX ORDER — CEFDINIR 300 MG/1
300 CAPSULE ORAL 2 TIMES DAILY
Qty: 10 CAPSULE | Refills: 0 | Status: SHIPPED | OUTPATIENT
Start: 2021-10-15 | End: 2021-10-20

## 2021-10-15 RX ADMIN — VANCOMYCIN HYDROCHLORIDE 1250 MG: 10 INJECTION, POWDER, LYOPHILIZED, FOR SOLUTION INTRAVENOUS at 05:32

## 2021-10-15 RX ADMIN — DEXAMETHASONE SODIUM PHOSPHATE 6 MG: 4 INJECTION, SOLUTION INTRA-ARTICULAR; INTRALESIONAL; INTRAMUSCULAR; INTRAVENOUS; SOFT TISSUE at 09:19

## 2021-10-15 NOTE — PLAN OF CARE
Goal Outcome Evaluation:  Plan of Care Reviewed With: patient           Outcome Summary: Pt resting. Pt on room air. Pt denies pain. Cont. pulse ox. SCDs on. Voiding. Pt NPO except ice chips. VSS. IVF and IV abx.

## 2021-10-15 NOTE — DISCHARGE SUMMARY
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 Dorado with ENT    Procedures Performed:   Imaging Results (All)     Procedure Component Value Units Date/Time    CT Soft Tissue Neck With Contrast [[id number]] Collected: 10/13/21 1003     Updated: 10/13/21 1028    Narrative:      Indication: Sore throat, stuffy nose, unable to drink and 8        DOSE LENGTH PRODUCT: 263 mGy cm. Automated exposure control was also  utilized to decrease patient radiation dose.     Technique: Axial CT of the neck after the uneventful administration of  Isovue iodinated contrast material. Sagittal and coronal reformations  were also provided for review.      Comparison: None     Findings:      The anterior and mid portions of the nasal cavity are patent, however,  there is complete attenuation of the posterior nasal cavity as well as a  complete attenuation of the nasopharyngeal airway extending down into  the oropharyngeal airway. In particular, there is an approximately 7.8 x  6.4 x 2.9 cm enhancing soft tissue mass which completely fills the  nasopharyngeal airway, extending anteriorly into the posterior nasal  cavity as well as inferiorly into the oropharyngeal airway (series  5-image 51 [phone] and series 3-image 44). There is quite a bit of mass  effect, with expansion of the nasopharyngeal airway and displacement of  the parapharyngeal fat. I do not see evidence of an infiltrative process  in regards to extension beyond the mucosal space or  involvement/destructive bony change along the skull base/clivus or into  the prevertebral soft tissues. This is in the region of the  adenoid  tissue and could reflect a massive adenoid hypertrophy. I do not see any  discrete soft tissue abscess. More inferiorly, the tonsillar fossae and  base of tongue are unremarkable. The oropharyngeal and hypopharyngeal  airways remained patent. Laryngeal structures are unremarkable. The  subglottic airway is unremarkable.     Included intracranial contents are unremarkable. Orbital contents are  unremarkable. Chronic mucosal thickening identified throughout the  paranasal sinuses. Major salivary glands are unremarkable. No obvious  oral cavity lesion. Floor of mouth soft tissues are unremarkable. There  are no inflammatory changes identified in the deep neck beyond the  mucosal space. No suspicious cervical adenopathy. Cervical vascular  structures are patent. The thyroid gland is homogeneous. No acute bony  abnormality is seen. Lung apices are clear. Complete opacification of  the left mastoids with fluid in the left middle ear cavity as well.       Impression:      Impression:        1. There is an enhancing soft tissue mass which completely fills and  also expands the nasopharyngeal airway, extending anteriorly into the  posterior nasal cavity and extending inferiorly into the oropharyngeal  airway. My primary consideration is a massive adenoid hypertrophy  without abscess given the location and lack of aggressive feature. After  accounting for the size, which is quite impressive, there appears  circumscribed and there are no aggressive features such as evidence for  extension beyond the mucosal space or destructive bony change. Neoplasia  seems less likely. ENT follow-up and/or follow-up CT should be  considered.  2. Left mastoid and left middle ear effusion which is likely secondary  to eustachian tube obstruction.  This report was finalized on 10/13/2021 10:25 by Dr aGston Quezada, .          Pertinent Test Results:   Lab Results (last 72 hours)     Procedure Component Value Units Date/Time    Blood  Culture - Blood, Arm, Left [[id number]]  (Normal) Collected: 10/13/21 0843    Specimen: Blood from Arm, Left Updated: 10/15/21 1001     Blood Culture No growth at 2 days    Blood Culture - Blood, Arm, Right [[id number]]  (Normal) Collected: 10/13/21 0843    Specimen: Blood from Arm, Right Updated: 10/15/21 1001     Blood Culture No growth at 2 days    Beta Strep Culture, Throat - Swab, Throat [[id number]]  (Normal) Collected: 10/13/21 0856    Specimen: Swab from Throat Updated: 10/15/21 0808     Throat Culture, Beta Strep No Beta Hemolytic Streptococcus Isolated    Narrative:      Group A Strep incidence is low in adults. Positive culture for Beta hemolytic Streptococcus species can reflect colonization and not true infection. Please correlate clinically.    Vancomycin, Trough [[id number]]  (Normal) Collected: 10/14/21 1218    Specimen: Blood Updated: 10/14/21 1251     Vancomycin Trough 6.20 mcg/mL     CBC & Differential [[id number]]  (Abnormal) Collected: 10/14/21 0647    Specimen: Blood Updated: 10/14/21 0752    Narrative:      The following orders were created for panel order CBC & Differential.  Procedure                               Abnormality         Status                     ---------                               -----------         ------                     Manual Differential[[id number]]          Abnormal            Final result               CBC Auto Differential[[id number]]        Abnormal            Final result                 Please view results for these tests on the individual orders.    CBC Auto Differential [[id number]]  (Abnormal) Collected: 10/14/21 0647    Specimen: Blood Updated: 10/14/21 0752     WBC 4.94 10*3/mm3      RBC 4.46 10*6/mm3      Hemoglobin 13.6 g/dL      Hematocrit 39.7 %      MCV 89.0 fL      MCH 30.5 pg      MCHC 34.3 g/dL      RDW 12.2 %      RDW-SD 40.1 fl      MPV 10.3 fL      Platelets 161 10*3/mm3     Manual Differential [[id number]]  (Abnormal) Collected: 10/14/21 0647     Specimen: Blood Updated: 10/14/21 0752     Neutrophil % 47.5 %      Lymphocyte % 29.3 %      Monocyte % 10.1 %      Bands %  7.1 %      Atypical Lymphocyte % 6.1 %      Neutrophils Absolute 2.69 10*3/mm3      Lymphocytes Absolute 1.75 10*3/mm3      Monocytes Absolute 0.50 10*3/mm3      RBC Morphology Normal     WBC Morphology Normal     Giant Platelets Slight/1+    Basic Metabolic Panel [[id number]]  (Abnormal) Collected: 10/14/21 0647    Specimen: Blood Updated: 10/14/21 0748     Glucose 110 mg/dL      BUN 10 mg/dL      Creatinine 0.44 mg/dL      Sodium 139 mmol/L      Potassium 4.1 mmol/L      Chloride 101 mmol/L      CO2 32.0 mmol/L      Calcium 8.9 mg/dL      eGFR Non African Amer >150 mL/min/1.73      BUN/Creatinine Ratio 22.7     Anion Gap 6.0 mmol/L     Narrative:      GFR Normal >60  Chronic Kidney Disease <60  Kidney Failure <15      COVID PRE-OP / PRE-PROCEDURE SCREENING ORDER (NO ISOLATION) - Swab, Nasal Cavity [[id number]]  (Abnormal) Collected: 10/13/21 0857    Specimen: Swab from Nasal Cavity Updated: 10/13/21 0957    Narrative:      The following orders were created for panel order COVID PRE-OP / PRE-PROCEDURE SCREENING ORDER (NO ISOLATION) - Swab, Nasal Cavity.  Procedure                               Abnormality         Status                     ---------                               -----------         ------                     COVID-19,[facility] IN-[facility]...[[id number]]  Abnormal            Final result                 Please view results for these tests on the individual orders.    COVID-19,[facility] IN-HOUSE,Nasal Swab No Transport Media 3-4 HR TAT - Swab, Nasal Cavity [[id number]]  (Abnormal) Collected: 10/13/21 0857    Specimen: Swab from Nasal Cavity Updated: 10/13/21 0957     COVID19 Detected    Narrative:      Fact sheet for providers: https://www.fda.gov/media/861306/download     Fact sheet for patients: https://www.fda.gov/media/195699/download    Test performed by PCR.    Consider negative  results in combination with clinical observations, patient history, and epidemiological information.    Rapid Strep A Screen - Swab, Throat [[id number]]  (Normal) Collected: 10/13/21 0856    Specimen: Swab from Throat Updated: 10/13/21 0925     Strep A Ag Negative    Comprehensive Metabolic Panel [[id number]]  (Abnormal) Collected: 10/13/21 0843    Specimen: Blood Updated: 10/13/21 0924     Glucose 120 mg/dL      BUN 7 mg/dL      Creatinine 0.68 mg/dL      Sodium 136 mmol/L      Potassium 3.8 mmol/L      Chloride 97 mmol/L      CO2 32.0 mmol/L      Calcium 9.0 mg/dL      Total Protein 7.3 g/dL      Albumin 4.50 g/dL      ALT (SGPT) 16 U/L      AST (SGOT) 37 U/L      Alkaline Phosphatase 93 U/L      Total Bilirubin 0.3 mg/dL      eGFR Non African Amer 139 mL/min/1.73      Globulin 2.8 gm/dL      A/G Ratio 1.6 g/dL      BUN/Creatinine Ratio 10.3     Anion Gap 7.0 mmol/L     Narrative:      GFR Normal >60  Chronic Kidney Disease <60  Kidney Failure <15      Mononucleosis Screen [[id number]]  (Normal) Collected: 10/13/21 0843    Specimen: Blood Updated: 10/13/21 0906     Monospot Negative    CBC & Differential [[id number]]  (Abnormal) Collected: 10/13/21 0843    Specimen: Blood Updated: 10/13/21 0859    Narrative:      The following orders were created for panel order CBC & Differential.  Procedure                               Abnormality         Status                     ---------                               -----------         ------                     CBC Auto Differential[[id number]]        Abnormal            Final result                 Please view results for these tests on the individual orders.    CBC Auto Differential [[id number]]  (Abnormal) Collected: 10/13/21 0843    Specimen: Blood Updated: 10/13/21 0859     WBC 4.77 10*3/mm3      RBC 4.90 10*6/mm3      Hemoglobin 14.5 g/dL      Hematocrit 43.3 %      MCV 88.4 fL      MCH 29.6 pg      MCHC 33.5 g/dL      RDW 12.4 %      RDW-SD 40.4 fl      MPV 10.0 fL       Platelets 169 10*3/mm3      Neutrophil % 58.5 %      Lymphocyte % 24.1 %      Monocyte % 16.6 %      Eosinophil % 0.2 %      Basophil % 0.4 %      Immature Grans % 0.2 %      Neutrophils, Absolute 2.79 10*3/mm3      Lymphocytes, Absolute 1.15 10*3/mm3      Monocytes, Absolute 0.79 10*3/mm3      Eosinophils, Absolute 0.01 10*3/mm3      Basophils, Absolute 0.02 10*3/mm3      Immature Grans, Absolute 0.01 10*3/mm3      nRBC 0.0 /100 WBC             Chief Complaint on Day of Discharge: Feels pretty good today.  Inquiring when he can go home.  States he would be better able to relax at home.      Hospital Course:  The patient is a 30 y.o. male who presented to [facility] with poor appetite, voice change, sore throat with some difficulty in swallowing.  Upon arrival a Covid test was performed and was found to be positive.  Patient has not been vaccinated for COVID-19.  It sounds like for the past few weeks he has not felt well, and specifically felt like he was having some difficulty swallowing, has lost his appetite, and really has only been able to tolerate liquids: For these reasons he presented to our facility for further work-up and assessment.  He denied having any symptoms of shortness of breath.  No significant cough.  No significant GI symptoms.  He is unaware of anyone in his family that is experiencing any Covid symptoms.  His Covid test was performed on 10/13 which was positive.    Based upon his symptoms and physical exam findings a CT scan of the neck was performed in the emergency department.  Those results are noted above.  ENT was consulted.  He was found to have a mass in his nasopharynx however this was not consistent with a tonsillitis or peritonsillar abscess.  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  This mass will need biopsy at some point in the near future, however we will  "postpone in the setting of COVID-19 infection.  I spoke with Dr. Dorado today, and really the only frame of reference we have regarding COVID-19 symptoms is his positive test on 10/13.  Therefore, we will wait until 10 days from 10/13 before planning on outpatient follow-up with biopsy.  There was no evidence of erosion on examination per ENT.    Plan to continue steroids with dexamethasone in the interim, to complete a total of 10 days.  I am also going to have him complete a short course of oral antibiotics with Omnicef.  I am get a give him a GI soft diet today to make sure that he can tolerate without problem in anticipation of discharge home.  Please note that there is a language barrier in my discussion with Mr. Bronson, and patient tried contacting a family member on his cell phone to assist in translation but could not get anyone to answer.  Therefore I utilized a translation robert that I have on my smart phone to assist him in getting the needed information regarding next steps moving forward.    Condition on Discharge:  Medically stable    Physical Exam on Discharge:  /68 (BP Location: Left arm, Patient Position: Lying)   Pulse 68   Temp 96.6 °F (35.9 °C) (Axillary)   Resp 16   Ht 182.9 cm (72\")   Wt 78 kg (172 lb)   SpO2 95%   BMI 23.33 kg/m²   Physical Exam  Constitutional:       General: He is not in acute distress.     Appearance: He is not toxic-appearing.   HENT:      Head: Normocephalic.      Nose:      Comments: Voice has a very nasal quality     Mouth/Throat:      Comments: Mass posterior to palate; airway not compromised.  Eyes:      Pupils: Pupils are equal, round, and reactive to light.   Cardiovascular:      Rate and Rhythm: Normal rate.   Pulmonary:      Effort: Pulmonary effort is normal. No respiratory distress.      Comments: On room air  Skin:     General: Skin is warm.   Neurological:      Mental Status: He is alert.   Psychiatric:         Mood and Affect: Mood normal. "       Discharge Disposition:  Home or Self Care    Discharge Medications:     Discharge Medications      New Medications      Instructions Start Date   cefdinir 300 MG capsule  Commonly known as: OMNICEF   300 mg, Oral, 2 Times Daily      dexamethasone 6 MG tablet  Commonly known as: DECADRON   6 mg, Oral, Daily With Breakfast      influenza vac split quad 0.5 ML suspension prefilled syringe injection  Commonly known as: FLUZONE,FLUARIX,AFLURIA,FLULAVAL   0.5 mL, Intramuscular, Once             Discharge Diet: as tolerated    Activity at Discharge: as tolerated    Discharge Care Plan/Instructions: If any symptoms worsen return to the emergency department for further assessment    Follow-up Appointments:   Plan to follow-up with Dr. Dorado in the ENT clinic around the last week of October (after 10/23 which will be 10 days from his positive Covid test on 10/13)    Test Results Pending at Discharge: outpatient biopsy of nasopharyngeal mass    Electronically signed by Jl Gonsales MD, 10/15/21, 14:11 CDT.    Time: 30 min

## 2021-10-16 ENCOUNTER — READMISSION MANAGEMENT (OUTPATIENT)
Dept: CALL CENTER | Facility: HOSPITAL | Age: 31
End: 2021-10-16

## 2021-10-16 NOTE — OUTREACH NOTE
Prep Survey      Responses   [facility] patient discharged from? [place]   Is LACE score < 7 ? Yes   Emergency Room discharge w/ pulse ox? No   Eligibility Readm Mgmt   Discharge diagnosis Nasopharyngeal mass,   COVID-19   Does the patient have one of the following disease processes/diagnoses(primary or secondary)? COVID-19   Does the patient have Home health ordered? No   Is there a DME ordered? No   Prep survey completed? Yes          Ebonie Lynn RN

## 2021-10-16 NOTE — OUTREACH NOTE
COVID-19 Week 1 Survey      Responses   [facility] patient discharged from? [place]   Does the patient have one of the following disease processes/diagnoses(primary or secondary)? COVID-19   COVID-19 underlying condition? None   Call Number Call 1  [CONTACT NUMBERS NOT ANSWERED, RINGS CONSTANTLY. ATTEMPTED X2]   Week 1 Call successful? No   Discharge diagnosis Nasopharyngeal mass,   COVID-19          Eun Ernandez RN

## 2021-10-17 ENCOUNTER — READMISSION MANAGEMENT (OUTPATIENT)
Dept: CALL CENTER | Facility: HOSPITAL | Age: 31
End: 2021-10-17

## 2021-10-17 NOTE — OUTREACH NOTE
COVID-19 Week 1 Survey      Responses   [facility] patient discharged from? [place]   Does the patient have one of the following disease processes/diagnoses(primary or secondary)? COVID-19   COVID-19 underlying condition? None   Call Number Call 2  [Contacted interpretor Shima ID [id number]. No answer at pt phone or spouse.  ]   Week 1 Call successful? No   Discharge diagnosis Nasopharyngeal mass,   COVID-19          Di Tomlin RN

## 2021-10-18 ENCOUNTER — READMISSION MANAGEMENT (OUTPATIENT)
Dept: CALL CENTER | Facility: HOSPITAL | Age: 31
End: 2021-10-18

## 2021-10-18 LAB
BACTERIA SPEC AEROBE CULT: NORMAL
BACTERIA SPEC AEROBE CULT: NORMAL

## 2021-10-18 NOTE — OUTREACH NOTE
COVID-19 Week 1 Survey      Responses   [facility] patient discharged from? [place]   Does the patient have one of the following disease processes/diagnoses(primary or secondary)? COVID-19   COVID-19 underlying condition? None   Call Number Call 3   Week 1 Call successful? No   Discharge diagnosis Nasopharyngeal mass,   COVID-19          JED GARCIA RN

## 2021-10-27 DIAGNOSIS — J39.2 NASOPHARYNGEAL MASS: Primary | ICD-10-CM

## 2021-10-27 RX ORDER — HYDROCODONE BITARTRATE AND ACETAMINOPHEN 5; 325 MG/1; MG/1
1 TABLET ORAL EVERY 4 HOURS PRN
Qty: 20 TABLET | Refills: 0 | Status: SHIPPED | OUTPATIENT
Start: 2021-10-27 | End: 2021-10-30

## 2021-11-01 ENCOUNTER — OFFICE VISIT (OUTPATIENT)
Dept: OTOLARYNGOLOGY | Facility: CLINIC | Age: 31
End: 2021-11-01

## 2021-11-01 VITALS
HEART RATE: 94 BPM | BODY MASS INDEX: 21.67 KG/M2 | WEIGHT: 159.8 LBS | SYSTOLIC BLOOD PRESSURE: 148 MMHG | DIASTOLIC BLOOD PRESSURE: 95 MMHG | TEMPERATURE: 98.2 F

## 2021-11-01 DIAGNOSIS — J39.2 NASOPHARYNGEAL MASS: Primary | ICD-10-CM

## 2021-11-01 DIAGNOSIS — J34.89 NASAL OBSTRUCTION: ICD-10-CM

## 2021-11-01 DIAGNOSIS — R22.1 NECK MASS: ICD-10-CM

## 2021-11-01 PROCEDURE — 31231 NASAL ENDOSCOPY DX: CPT | Performed by: OTOLARYNGOLOGY

## 2021-11-01 PROCEDURE — 88275 CYTOGENETICS 100-300: CPT | Performed by: OTOLARYNGOLOGY

## 2021-11-01 PROCEDURE — 88305 TISSUE EXAM BY PATHOLOGIST: CPT | Performed by: OTOLARYNGOLOGY

## 2021-11-01 PROCEDURE — 88271 CYTOGENETICS DNA PROBE: CPT | Performed by: OTOLARYNGOLOGY

## 2021-11-01 PROCEDURE — 88341 IMHCHEM/IMCYTCHM EA ADD ANTB: CPT | Performed by: OTOLARYNGOLOGY

## 2021-11-01 PROCEDURE — 88342 IMHCHEM/IMCYTCHM 1ST ANTB: CPT | Performed by: OTOLARYNGOLOGY

## 2021-11-01 PROCEDURE — 99213 OFFICE O/P EST LOW 20 MIN: CPT | Performed by: OTOLARYNGOLOGY

## 2021-11-01 PROCEDURE — 42800 BIOPSY OF THROAT: CPT | Performed by: OTOLARYNGOLOGY

## 2021-11-01 PROCEDURE — 88360 TUMOR IMMUNOHISTOCHEM/MANUAL: CPT | Performed by: OTOLARYNGOLOGY

## 2021-11-01 RX ORDER — DEXAMETHASONE 4 MG/1
4 TABLET ORAL 2 TIMES DAILY WITH MEALS
COMMUNITY

## 2021-11-01 RX ORDER — HYDROCODONE BITARTRATE AND ACETAMINOPHEN 7.5; 325 MG/1; MG/1
1 TABLET ORAL EVERY 4 HOURS PRN
Qty: 20 TABLET | Refills: 0 | Status: SHIPPED | OUTPATIENT
Start: 2021-11-01 | End: 2021-11-04

## 2021-11-01 RX ORDER — CEFDINIR 250 MG/5ML
POWDER, FOR SUSPENSION ORAL 2 TIMES DAILY
COMMUNITY

## 2021-11-01 RX ORDER — HYDROCODONE BITARTRATE AND ACETAMINOPHEN 10; 325 MG/1; MG/1
1 TABLET ORAL EVERY 6 HOURS PRN
COMMUNITY

## 2021-11-01 NOTE — PROGRESS NOTES
[facility] Otolaryngology Head and Neck Surgery  PROCEDURE NOTE      Pre-operative Diagnosis:   1. Nasopharyngeal mass    2. Neck mass    3. Nasal obstruction         Post-operative Diagnosis: same    Anesthesia: topical 4% tetracaine and oxymetazoline mix    Procedure: Biopsy of oropharyngeal mass    Estimated Blood Loss:  Minimal    Procedure Details:    Informed consent obtained.  The patient extended the tongue.  The lesion was Biopsied    Findings:   Large necrotic mass in the posterior oropharyngeal region extending upward in the nasopharyngeal region, very friable, very soft    Condition:  Stable.  Patient tolerated procedure well.    Complications:  None  Post-procedure instructions reviewed with Patient, Spouse  Instructions documented in AVS for patient to review

## 2021-11-01 NOTE — PATIENT INSTRUCTIONS
Salt water gargles    Will call pathology    MRI ordered      CONTACT INFORMATION:  The main office phone number is [phone]. For emergencies after hours and on weekends, this number will convert over to our answering service and the on call provider will answer. Please try to keep non emergent phone calls/ questions to office hours 9am-5pm Monday through Friday.     Asterisk  As an alternative, you can sign up and use the Epic MyChart system for more direct and quicker access for non emergent questions/ problems.  [facility] Asterisk allows you to send messages to your doctor, view your test results, renew your prescriptions, schedule appointments, and more. To sign up, go to [employer] and click on the Sign Up Now link in the New User? box. Enter your Asterisk Activation Code exactly as it appears below along with the last four digits of your Social Security Number and your Date of Birth () to complete the sign-up process. If you do not sign up before the expiration date, you must request a new code.    Asterisk Activation Code: [email or web address]  Expires: 2021  2:41 PM    If you have questions, you can email [email or web address] or call [phone] to talk to our Asterisk staff. Remember, Asterisk is NOT to be used for urgent needs. For medical emergencies, dial 911.

## 2021-11-01 NOTE — PROGRESS NOTES
[facility] Otolaryngology Head and Neck Surgery  CLINIC NOTE    Chief Complaint   Patient presents with   • Mass     nasopharyngeal mass          HPI   Follow up  Accompanied by:  Wife  Paras Bronson is a 31 y.o. male who is here for follow up. He has had increasing weight loss.  He has mass previously identified. He has had recent covid, now cleared.  He has had weight loss, loss of sleep. Some pain.  Smoke- none  Drink- none  He is status post No surgery found on No surgery found.        History     Last Reviewed by Clayton Dorado Jr., MD on 11/1/2021 at  2:26 PM    Sections Reviewed    Medical, Family, Tobacco, Surgical      Problem list reviewed by Clayton Dorado Jr., MD on 11/1/2021 at  2:26 PM  Medicines reviewed by Clayton Dorado Jr., MD on 11/1/2021 at  2:26 PM  Allergies reviewed by Clayton Dorado Jr., MD on 11/1/2021 at  2:26 PM         Vital Signs:   Temp:  [98.2 °F (36.8 °C)] 98.2 °F (36.8 °C)  Heart Rate:  [94] 94  BP: (148)/(95) 148/95    Physical Exam  Vitals reviewed.   Constitutional:       General: He is not in acute distress.     Appearance: He is well-developed. He is ill-appearing.   HENT:      Head: Atraumatic.      Comments: Mild temporal wasting     Right Ear: External ear normal.      Left Ear: External ear normal.      Nose: Nose normal. No nasal deformity.      Right Nostril: Occlusion present.      Left Nostril: Occlusion present.      Comments: Drainage anterior  See procedure note     Mouth/Throat:      Lips: Pink.      Mouth: Mucous membranes are dry.      Dentition: Abnormal dentition. No gum lesions.      Tongue: No lesions. Tongue does not deviate from midline.      Palate: No mass and lesions.      Tonsils: No tonsillar exudate.        Comments: Palate depressed by NP mass  Necrotic mass down posterior wall, firm and extending into OP  Foul smell  Eyes:      General: Lids are normal. Gaze aligned appropriately.       Extraocular Movements: Extraocular movements intact.      Conjunctiva/sclera: Conjunctivae normal.      Comments: Color brown   Neck:      Thyroid: No thyroid mass or thyromegaly.        Comments: Very hyponasal speech  Cardiovascular:      Rate and Rhythm: Normal rate and regular rhythm.   Pulmonary:      Effort: Pulmonary effort is normal. No respiratory distress.      Breath sounds: No stridor.   Musculoskeletal:         General: Normal range of motion.      Cervical back: Normal range of motion.   Lymphadenopathy:      Cervical: Cervical adenopathy present.      Left cervical: Superficial cervical adenopathy (Over SCM, level 2) present.   Skin:     Findings: No rash.   Neurological:      General: No focal deficit present.      Mental Status: He is alert and oriented to person, place, and time.      Cranial Nerves: Cranial nerves are intact. No cranial nerve deficit.      Comments: Weak appearing   Psychiatric:         Attention and Perception: Attention and perception normal.         Mood and Affect: Mood and affect normal.         Behavior: Behavior normal. Behavior is cooperative.         Thought Content: Thought content normal.         Judgment: Judgment normal.               Result Review       I have reviewed the patients old records in the chart.  The following results/records were reviewed:    CT Soft Tissue Neck With Contrast (10/13/2021 10:00)            Assessment:        Diagnosis Plan   1. Nasopharyngeal mass  HYDROcodone-acetaminophen (NORCO) 7.5-325 MG per tablet    Tissue Pathology Exam    Tissue Pathology Exam    Obstructing and extending into the oropharynx   2. Neck mass  HYDROcodone-acetaminophen (NORCO) 7.5-325 MG per tablet    Left side level 2 over the sternocleidomastoid muscle   3. Nasal obstruction      Total              Plan:        Medical and surgical options were discussed including observation and surgical management. Risks, benefits and alternatives were discussed and questions  were answered. After considering the options, the patient decided to proceed with biopsy.  Patient has a large obstructing mass of the nasopharynx extending on the posterior wall into the oropharynx.  This is a friable mass.  Multiple biopsies have been taken with minimal bleeding.  I will await pathology on the biopsies.  If the patient does not have a good biopsy from this minimally invasive office procedure, I will plan an in hospital procedure.  I am concerned this is malignancy, especially lymphoma or nasopharyngeal carcinoma.  He now has developed a left neck mass.  Depending on the results of my pathology, I may consider biopsy of the left neck mass.  MRI scan of NP mass ordered  Norco for pain     New Medications Ordered This Visit   Medications   • HYDROcodone-acetaminophen (NORCO) 7.5-325 MG per tablet     Sig: Take 1 tablet by mouth Every 4 (Four) Hours As Needed for Moderate Pain  or Severe Pain  for up to 3 days.     Dispense:  20 tablet     Refill:  0          MY CHART:  Patient is Encouraged to enroll in My Chart  Encouraged to review data and findings in My Chart    Patient, Spouse understand(s) and agree(s) with the treatment plan as described.    Return Will call pathology, for Recheck NP.            Clayton Dorado Jr, MD  11/01/21  14:26 CDT

## 2021-11-01 NOTE — PROGRESS NOTES
[facility] Otolaryngology Head and Neck Surgery  PROCEDURE NOTE  Anesthesia: topical 4% tetracaine and oxymetazoline mix    Endoscopy Type:  Nasal Endoscopic Examination    Indications: No diagnosis found.     Procedure Details:    The patient was placed in the sitting position. After topical anesthesia and decongestion,  a flexible laryngoscope was passed along the nasal floor to the nasopharynx.  The scope was then passed to the middle and superior aspects of the nasal cavity. Systematic examination of the nasal cavity, nasopharynx and structures was performed.     Findings:  NASAL ENDOSCOPIC FINDINGS:    Nasal endoscopy   NASALMUCOSA:    abnormal:        Bilateral- Red, thickened, wet    NASAL PASSAGES:     Obstructed:   Bilateral- Posterior from the posterior third of the inferior turbinate all the way up to the nasal vault by friable tumor    NASAL VALVE:     intact, good support and no nasal obstruction   SEPTUM:     mucosa abnormal   Bilateral    midline   INFERIOR TURBINATES:     abnormal  Bilateral- Red, wet, boggy    MIDDLE TURBINATES:     normal size, non-obstructing, no lesions, middle meatus non-obstructed, no lesions/polyps   MIDDLE MEATUS:      Normal, patent, no mucopus, non-surgical  Bilateral   SPHENO-ETHMOID RECESS:    not visualized:  Bilateral- Total obstruction with a friable tumor present    NASOPHARYNX:     Vault size:  Not evaluated because tumor size   SECRETIONS:     abnormal Bilateral- Cloudy, clear, thick, no flow posterior because of obstructing tumor   Scope unable to pass between the tumor and the posterior palate.    Condition:  Stable.  Patient tolerated procedure well.    Complications:  None    Post-procedure instructions reviewed with Patient, Spouse  Instructions documented in AVS for patient to review

## 2021-11-04 ENCOUNTER — HOSPITAL ENCOUNTER (EMERGENCY)
Age: 31
Discharge: HOME | End: 2021-11-04
Attending: EMERGENCY MEDICINE

## 2021-11-04 VITALS
RESPIRATION RATE: 20 BRPM | HEIGHT: 71 IN | HEART RATE: 106 BPM | DIASTOLIC BLOOD PRESSURE: 78 MMHG | WEIGHT: 159 LBS | BODY MASS INDEX: 22.26 KG/M2 | OXYGEN SATURATION: 98 % | TEMPERATURE: 97.8 F | SYSTOLIC BLOOD PRESSURE: 110 MMHG

## 2021-11-04 DIAGNOSIS — J39.2 PHARYNGEAL MASS: Primary | ICD-10-CM

## 2021-11-04 LAB
ALBUMIN SERPL-MCNC: 4.2 G/DL (ref 3.5–5.2)
ALP BLD-CCNC: 84 U/L (ref 40–130)
ALT SERPL-CCNC: 12 U/L (ref 5–41)
ANION GAP SERPL CALCULATED.3IONS-SCNC: 9 MMOL/L (ref 7–19)
AST SERPL-CCNC: 12 U/L (ref 5–40)
BASOPHILS ABSOLUTE: 0 K/UL (ref 0–0.2)
BASOPHILS RELATIVE PERCENT: 0.3 % (ref 0–1)
BILIRUB SERPL-MCNC: 0.3 MG/DL (ref 0.2–1.2)
BUN BLDV-MCNC: 11 MG/DL (ref 6–20)
CALCIUM SERPL-MCNC: 9.4 MG/DL (ref 8.6–10)
CHLORIDE BLD-SCNC: 94 MMOL/L (ref 98–111)
CO2: 31 MMOL/L (ref 22–29)
CREAT SERPL-MCNC: 0.5 MG/DL (ref 0.5–1.2)
EOSINOPHILS ABSOLUTE: 0.1 K/UL (ref 0–0.6)
EOSINOPHILS RELATIVE PERCENT: 1 % (ref 0–5)
GFR AFRICAN AMERICAN: >59
GFR NON-AFRICAN AMERICAN: >60
GLUCOSE BLD-MCNC: 93 MG/DL (ref 74–109)
HCT VFR BLD CALC: 41.6 % (ref 42–52)
HEMOGLOBIN: 14.2 G/DL (ref 14–18)
IMMATURE GRANULOCYTES #: 0 K/UL
LYMPHOCYTES ABSOLUTE: 1.5 K/UL (ref 1.1–4.5)
LYMPHOCYTES RELATIVE PERCENT: 16.9 % (ref 20–40)
MCH RBC QN AUTO: 30.4 PG (ref 27–31)
MCHC RBC AUTO-ENTMCNC: 34.1 G/DL (ref 33–37)
MCV RBC AUTO: 89.1 FL (ref 80–94)
MONO TEST: NEGATIVE
MONOCYTES ABSOLUTE: 1.1 K/UL (ref 0–0.9)
MONOCYTES RELATIVE PERCENT: 12.5 % (ref 0–10)
NEUTROPHILS ABSOLUTE: 6 K/UL (ref 1.5–7.5)
NEUTROPHILS RELATIVE PERCENT: 69.1 % (ref 50–65)
PDW BLD-RTO: 11.9 % (ref 11.5–14.5)
PLATELET # BLD: 180 K/UL (ref 130–400)
PMV BLD AUTO: 10.1 FL (ref 9.4–12.4)
POTASSIUM SERPL-SCNC: 3.5 MMOL/L (ref 3.5–5)
RBC # BLD: 4.67 M/UL (ref 4.7–6.1)
SARS-COV-2, NAAT: DETECTED
SODIUM BLD-SCNC: 134 MMOL/L (ref 136–145)
TOTAL PROTEIN: 7.4 G/DL (ref 6.6–8.7)
WBC # BLD: 8.7 K/UL (ref 4.8–10.8)

## 2021-11-04 PROCEDURE — 85025 COMPLETE CBC W/AUTO DIFF WBC: CPT

## 2021-11-04 PROCEDURE — 86308 HETEROPHILE ANTIBODY SCREEN: CPT

## 2021-11-04 PROCEDURE — 99284 EMERGENCY DEPT VISIT MOD MDM: CPT

## 2021-11-04 PROCEDURE — 96374 THER/PROPH/DIAG INJ IV PUSH: CPT

## 2021-11-04 PROCEDURE — 80053 COMPREHEN METABOLIC PANEL: CPT

## 2021-11-04 PROCEDURE — 36415 COLL VENOUS BLD VENIPUNCTURE: CPT

## 2021-11-04 PROCEDURE — 2580000003 HC RX 258: Performed by: EMERGENCY MEDICINE

## 2021-11-04 PROCEDURE — 87635 SARS-COV-2 COVID-19 AMP PRB: CPT

## 2021-11-04 PROCEDURE — 6360000002 HC RX W HCPCS: Performed by: PHYSICIAN ASSISTANT

## 2021-11-04 PROCEDURE — 87040 BLOOD CULTURE FOR BACTERIA: CPT

## 2021-11-04 RX ORDER — HYDROCODONE BITARTRATE AND ACETAMINOPHEN 7.5; 325 MG/1; MG/1
1 TABLET ORAL EVERY 6 HOURS PRN
COMMUNITY

## 2021-11-04 RX ORDER — AMOXICILLIN AND CLAVULANATE POTASSIUM 875; 125 MG/1; MG/1
1 TABLET, FILM COATED ORAL 2 TIMES DAILY
Qty: 14 TABLET | Refills: 0 | Status: SHIPPED | OUTPATIENT
Start: 2021-11-04 | End: 2021-11-11

## 2021-11-04 RX ORDER — SODIUM CHLORIDE, SODIUM LACTATE, POTASSIUM CHLORIDE, AND CALCIUM CHLORIDE .6; .31; .03; .02 G/100ML; G/100ML; G/100ML; G/100ML
1000 INJECTION, SOLUTION INTRAVENOUS ONCE
Status: COMPLETED | OUTPATIENT
Start: 2021-11-04 | End: 2021-11-04

## 2021-11-04 RX ORDER — SODIUM CHLORIDE 9 MG/ML
INJECTION, SOLUTION INTRAVENOUS CONTINUOUS
Status: DISCONTINUED | OUTPATIENT
Start: 2021-11-04 | End: 2021-11-04

## 2021-11-04 RX ORDER — HYDROMORPHONE HYDROCHLORIDE 1 MG/ML
0.5 INJECTION, SOLUTION INTRAMUSCULAR; INTRAVENOUS; SUBCUTANEOUS ONCE
Status: COMPLETED | OUTPATIENT
Start: 2021-11-04 | End: 2021-11-04

## 2021-11-04 RX ADMIN — HYDROMORPHONE HYDROCHLORIDE 0.5 MG: 1 INJECTION, SOLUTION INTRAMUSCULAR; INTRAVENOUS; SUBCUTANEOUS at 18:48

## 2021-11-04 RX ADMIN — SODIUM CHLORIDE, SODIUM LACTATE, POTASSIUM CHLORIDE, AND CALCIUM CHLORIDE 1000 ML: 600; 310; 30; 20 INJECTION, SOLUTION INTRAVENOUS at 18:48

## 2021-11-04 ASSESSMENT — ENCOUNTER SYMPTOMS
TROUBLE SWALLOWING: 1
FACIAL SWELLING: 1
SORE THROAT: 1
VOICE CHANGE: 1
VOMITING: 0
SHORTNESS OF BREATH: 0
NAUSEA: 0

## 2021-11-04 ASSESSMENT — PAIN SCALES - GENERAL
PAINLEVEL_OUTOF10: 5
PAINLEVEL_OUTOF10: 10

## 2021-11-04 ASSESSMENT — PAIN DESCRIPTION - LOCATION: LOCATION: THROAT

## 2021-11-04 ASSESSMENT — PAIN DESCRIPTION - DESCRIPTORS: DESCRIPTORS: PATIENT UNABLE TO DESCRIBE

## 2021-11-04 NOTE — ED PROVIDER NOTES
[facility] EMERGENCY DEPT  eMERGENCY dEPARTMENT eNCOUnter      Pt Name: Joni Rivera  MRN: [id number]  [place] 1990  Date of evaluation: 11/4/2021  Provider: Kalee Solo, [address] [place] [address]       Chief Complaint   Patient presents with    Pharyngitis         HISTORY OF PRESENT ILLNESS   (Location/Symptom, Timing/Onset,Context/Setting, Quality, Duration, Modifying Factors, Severity)  Note limiting factors. Joni Rivera is a 36 y.o. male who denies any significant medical history who presents to the emergency department with pain and swelling in the throat. The patient states over the last 2 months, he has had worsening pain in the throat and swelling. His spouse is also with him and gave part of his history. She states that he has been having a worsening muffled sound of his voice over the last 3 weeks. She states they have been to [address] [place] [address] on 3 different occassions for the pain. He followed up with an ENT at [facility] on Monday and was given Norco for pain and a biopsy was obtained. The patient states that the pain has worsened. I was actually able to call and speak with ENT, Dr Claudean Batman, at [facility] who saw the patient on Monday. Dr Claudean Batman has been managing the patient's care since he was first seen and admitted at [facility] with Carin. He noted that the mass does extend to the nasopharynx and that it has been growing. He performed a biopsy of the lesion on Monday and results are pending. He noted that the patient has lost over 30 pounds since he first saw him. He states he has concern for possible malignancy. He states that if the patient does not need to be admitted for medical reasons, he should continue with plan to follow up after biopsy results. HPI    NursingNotes were reviewed.     REVIEW OF SYSTEMS    (2-9 systems for level 4, 10 or more for level 5)     Review of Systems   Constitutional: Positive for appetite change (Unable to eat or drink) and unexpected weight change (Lost over 30 pounds). Negative for chills and fever. HENT: Positive for drooling, facial swelling, sore throat, trouble swallowing and voice change (muffled). Respiratory: Negative for shortness of breath. Cardiovascular: Negative for chest pain. Gastrointestinal: Negative for nausea and vomiting. Musculoskeletal: Negative for neck pain and neck stiffness. Neurological: Negative for dizziness and headaches. PAST MEDICALHISTORY   History reviewed. No pertinent past medical history. SURGICAL HISTORY       Past Surgical History:   Procedure Laterality Date    FOOT SURGERY Left          CURRENT MEDICATIONS     Previous Medications    HYDROCODONE-ACETAMINOPHEN (NORCO) 7.5-325 MG PER TABLET    Take 1 tablet by mouth every 6 hours as needed for Pain. ALLERGIES     Patient has no known allergies. FAMILY HISTORY     History reviewed. No pertinent family history. SOCIAL HISTORY       Social History     Socioeconomic History    Marital status:      Spouse name: None    Number of children: None    Years of education: None    Highest education level: None   Occupational History    None   Tobacco Use    Smoking status: Never Smoker    Smokeless tobacco: Never Used   Substance and Sexual Activity    Alcohol use: Yes     Comment: 2/week    Drug use: Not Currently    Sexual activity: None   Other Topics Concern    None   Social History Narrative    None     Social Determinants of Health     Financial Resource Strain:     Difficulty of Paying Living Expenses:    Food Insecurity:     Worried About Running Out of Food in the Last Year:     Ran Out of Food in the Last Year:    Transportation Needs:     Lack of Transportation (Medical):      Lack of Transportation (Non-Medical):    Physical Activity:     Days of Exercise per Week:     Minutes of Exercise per Session:    Stress:     Feeling of Stress :    Social Connections:     Frequency of Communication with Friends and Family: (*)     Chloride 94 (*)     CO2 31 (*)     All other components within normal limits   CBC WITH AUTO DIFFERENTIAL - Abnormal; Notable for the following components:    RBC 4.67 (*)     Hematocrit 41.6 (*)     Neutrophils % 69.1 (*)     Lymphocytes % 16.9 (*)     Monocytes % 12.5 (*)     Monocytes Absolute 1.10 (*)     All other components within normal limits   CULTURE, BLOOD 1   CULTURE, BLOOD 2   MONONUCLEOSIS SCREEN       All other labs were within normal range or not returned as of this dictation. EMERGENCY DEPARTMENT COURSE and DIFFERENTIAL DIAGNOSIS/MDM:   Vitals:    Vitals:    11/04/21 1536   BP: 111/76   Pulse: 107   Resp: 17   Temp: 97.8 °F (36.6 °C)   TempSrc: Temporal   SpO2: 94%   Weight: 159 lb (72.1 kg)   Height: 5' 10.87\" (1.8 m)       MDM    Patient is a 35 yo  male who is Chinese speaking. Patient's wife does speak english and was an additional historian. The patient has had ongoing worsening of a mass of the left tonsil. On PE, he does have some pooling of secretions and is in obvious discomfort. He was given abx and fluids in the ED. His vitals are stable. I spoke with his established ENT who recommends the patient follow up as long as no acute medical abnormalities warrant inpatient stay. His CBC is not concerning for leukocytosis or significant anemia. CMP does reveal mild electrolyte disturbances including hyponatremia and hypochloremia-he was given fluids in ED. Kidney and liver function is within normal limits. Mono negative. COVID positive. Blood cultures pending. Patient was made aware of these findings. I discussed this case in detail with attending physician Dr Nick Herman. We plan to dc home with oral abx as prophylaxis for infection. At this time, I do not feel benefit of steroids outweigh risk. The patient will also be sent home with magic mouthwash for additional pain control.  I discussed in detail with the patient and his wife the importance of follow up with their established ENT. I also stressed return precautions in detail. All of their questions were answered. He passed PO challenge and has no indications for inpatient placement at this time. Reassessment  1720: Pain is controlled by dilaudid. He is resting comfortably and in no acute distress. He is able to drink water through a straw. CONSULTS:  None   Dr Leatha Blue: ENT at [address] [facility] [address], see HPI        FINAL IMPRESSION      1.  Pharyngeal mass          DISPOSITION/PLAN   DISPOSITION        PATIENT REFERRED TO:  Nicho Ramirez MD  [address] [facility]  [phone]    Schedule an appointment as soon as possible for a visit         DISCHARGE MEDICATIONS:  New Prescriptions    AMOXICILLIN-CLAVULANATE (AUGMENTIN) 875-125 MG PER TABLET    Take 1 tablet by mouth 2 times daily for 7 days          (Please note that portions of this note were completed with a voice recognition program.  Efforts were made to edit thedictations but occasionally words are mis-transcribed.)    ANISHA Oneil (electronically signed)     Britni Crandall, [address]  11/04/21 3252

## 2021-11-04 NOTE — Clinical Note
Alvin Youssef was seen and treated in our emergency department on 11/4/2021. He may return to work on 11/09/2021. If you have any questions or concerns, please don't hesitate to call.       Emigdio Medina

## 2021-11-04 NOTE — Clinical Note
Sarita Crocker was seen and treated in our emergency department on 11/4/2021. He may return to work on 11/09/2021. If you have any questions or concerns, please don't hesitate to call.       [place], [address]

## 2021-11-04 NOTE — ED NOTES
ASSESSMENT:  Pt co swelling and pain to L neck/throat area. SKIN:  Warm, dry, pink. Cap refill < 2 sec  CARDIAC:  S1 S2 noted  LUNGS: clear upper and lower lobes. Respirations even and unlabored. ABDOMEN: bowel sounds noted upper and lower quadrants. Soft and tender. EXTREMITIES: bilateral DP and PT. No edema noted. Pt alert and oriented x4. Pupils equal and reactive. No distress noted. Side rails up and call light within reach.        Kelton Alexander RN  11/04/21 4099

## 2021-11-04 NOTE — Clinical Note
Wanda Curtis was seen and treated in our emergency department on 11/4/2021. He may return to work on 11/09/2021. If you have any questions or concerns, please don't hesitate to call.       Emigdio Lopez

## 2021-11-05 ENCOUNTER — CARE COORDINATION (OUTPATIENT)
Dept: CARE COORDINATION | Age: 31
End: 2021-11-05

## 2021-11-05 DIAGNOSIS — J39.2 NASOPHARYNGEAL MASS: Primary | ICD-10-CM

## 2021-11-05 NOTE — CARE COORDINATION
Date/Time:  11/8/2021 11:27 AM  Attempted to reach patient by telephone with  on call. Recording states that voice mail box has not been set up yet. Date/Time:  11/5/2021 2:29 PM  Attempted to reach patient by telephone with  on call. Call within 2 business days of discharge: Yes Recording states that voice mail has not been set up yet. Will attempt to reach patient again.

## 2021-11-08 ENCOUNTER — CONSULT (OUTPATIENT)
Dept: ONCOLOGY | Facility: CLINIC | Age: 31
End: 2021-11-08

## 2021-11-08 ENCOUNTER — LAB (OUTPATIENT)
Dept: LAB | Facility: HOSPITAL | Age: 31
End: 2021-11-08

## 2021-11-08 VITALS
TEMPERATURE: 97.7 F | OXYGEN SATURATION: 97 % | HEART RATE: 110 BPM | SYSTOLIC BLOOD PRESSURE: 136 MMHG | WEIGHT: 156.1 LBS | RESPIRATION RATE: 16 BRPM | HEIGHT: 72 IN | DIASTOLIC BLOOD PRESSURE: 84 MMHG | BODY MASS INDEX: 21.14 KG/M2

## 2021-11-08 DIAGNOSIS — C83.71 BURKITT LYMPHOMA OF LYMPH NODES OF HEAD (HCC): Primary | ICD-10-CM

## 2021-11-08 PROCEDURE — 99205 OFFICE O/P NEW HI 60 MIN: CPT | Performed by: INTERNAL MEDICINE

## 2021-11-08 NOTE — PROGRESS NOTES
[facility] HEMATOLOGY [facility]COLOGY  [address]  [facility] [address]  [phone]       11/08/2021     CONSULT NOTE     PATIENT NAME: Paras Bronson  YOB: 1990  MR: [id number]    REFERRING PROVIDER: Clayton Dorado Jr., MD  PCP: Provider, No Known    REASON FOR REFERRAL: Newly diagnosed Burkitt lymphoma    HPI:   Mr. Paras Bronson is a very pleasant 31 y.o.  male who has noticed voice changes for about a month and progressively worsening left neck mass for about 2 weeks.  The patient was in the emergency room and had CT of the neck that showed a nasopharyngeal mass filling the nasopharyngeal airway.  Since then, the patient also has noted a left neck mass which is enlarging very fast.    The patient was on my schedule for 11/10/2021.  However, after I reviewed the pathology report, I asked the patient to come to my office and hence he was evaluated today.  He came accompanied by his wife.  The patient speaks only Tajik.  The wife speaks both English and Tajik.  She is fluent in English.    I explained to them that the patient has a highly aggressive cancer, in this case Burkitt lymphoma.  I gave the patient a copy of the pathology report as well as the CT neck report.    I explained that without treatment this can be a fatal cancer in a few weeks.  I also advised him that with treatment there is a very good chance of cure but he should be treated at an academic center since this type of lymphoma requires intensive chemotherapy and is not generally treated in the community setting.    I advised the patient to seek treatment in academic center immediately, if not today then tomorrow and he agreed that he would do so.  He has family in [place] so he is planning on going there ASA.    PAST HISTORY:     HEME/ONC HISTORY  Oncology/Hematology History Overview Note   HEME/ONC DX/RX/INVESTIGATIONS  SUMMARY:   DX: Burkitt lymphoma, untreated     RX: None  OTHER INFO:     INVESTIGATIONS:   10/13/021, CT neck:  1. There is an enhancing soft tissue mass which completely fills and also expands the nasopharyngeal airway, extending anteriorly into the posterior nasal cavity and extending inferiorly into the oropharyngeal airway. My primary consideration is a massive adenoid hypertrophy without abscess given the location and lack of aggressive feature. After accounting for the size, which is quite impressive, there appears circumscribed and there are no aggressive features such as evidence for extension beyond the mucosal space or destructive bony change. Neoplasia seems less likely. ENT follow-up and/or follow-up CT should be considered. 2. Left mastoid and left middle ear effusion which is likely secondary to eustachian tube obstruction.     11/2/2021, Nasopharynx, biopsies: Involvement by high-grade B-cell lymphoma. Comment: Morphologically and immunophenotypically, findings are most consistent with a Burkitt lymphoma.       MEDICAL HISTORY   has a past medical history of COVID-19 (10/2021) and Tonsillitis.   HEALTH MAINTENANCE ITEMS  Health Maintenance Due   Topic Date Due   • ANNUAL PHYSICAL  Never done   • COVID-19 Vaccine (1) Never done   • INFLUENZA VACCINE  Never done   • HEPATITIS C SCREENING  Never done     <no information>  Last Completed Colonoscopy     This patient has no relevant Health Maintenance data.        Immunization History   Administered Date(s) Administered   • Tdap 11/23/2018     Last Completed Mammogram     This patient has no relevant Health Maintenance data.        FAMILY HISTORY  History reviewed. No pertinent family history.    Cancer-related family history is not on file.   SURGICAL HISTORY   has no past surgical history on file.  SOCIAL HISTORY  Social History     Socioeconomic History   • Marital status:    Tobacco Use   • Smoking status: Never Smoker   Vaping Use   • Vaping  "Use: Never used   Substance and Sexual Activity   • Alcohol use: No   • Drug use: No     MEDICATIONS:     Current Outpatient Medications   Medication Instructions   • cefdinir (OMNICEF) 250 MG/5ML suspension Oral, 2 Times Daily   • dexamethasone (DECADRON) 4 mg, Oral, 2 Times Daily With Meals   • HYDROcodone-acetaminophen (NORCO)  MG per tablet 1 tablet, Oral, Every 6 Hours PRN         No current facility-administered medications for this visit.     ALLERGIES:   No Known Allergies  ROS:   Pertinent positive and negative findings as noted in HPI.   PHYSICAL EXAM:   /84   Pulse 110   Temp 97.7 °F (36.5 °C)   Resp 16   Ht 182.9 cm (72\")   Wt 70.8 kg (156 lb 1.6 oz)   SpO2 97%   BMI 21.17 kg/m²  Body surface area is 1.92 meters squared.   Pain Score    11/08/21 1114   PainSc:   8     Alert, oriented x3, cooperative, not in distress.  His voice is very muffled (secondary to nasopharyngeal obstruction).  HEENT: Normocephalic, wearing a facemask.  Neck: Left neck mass, measures at least 4 cm in the largest diameter.  Lungs: No tachypnea. Clear bilaterally.  Heart: Regular rate and rhythm.  Extremities: No ankle edema.  Neurologic: Moves all extremities.    INVESTIGATIONS/LABS:     Lab Results - Last 18 Months   Lab Units 11/04/21  1641 10/14/21  0647 10/13/21  0843   WBC K/uL 8.7 4.94 4.77   HEMOGLOBIN g/dL 14.2 13.6 14.5   HEMATOCRIT % 41.6* 39.7 43.3   MCV fL 89.1 89.0 88.4   PLATELETS K/uL 180 161 169   IMM GRAN % %  --   --  0.2   NEUTROS ABS K/uL 6.0 2.69 2.79   LYMPHS ABS K/uL 1.5  --  1.15   MONOS ABS K/uL 1.10*  --  0.79   EOS ABS K/uL 0.10  --  0.01   BASOS ABS K/uL 0.00  --  0.02   IMMATURE GRANS (ABS) K/uL 0.0  --  0.01   NRBC /100 WBC  --   --  0.0   NEUTROPHIL % %  --  47.5  --    MONOCYTES % %  --  10.1  --    ATYP LYMPH % %  --  6.1*  --    GIANT PLT   --  Slight/1+  --        Lab Results - Last 18 Months   Lab Units 11/04/21  1641 10/14/21  0647 10/13/21  0843   BUN mg/dL 11 10 7 "   CREATININE mg/dL 0.5 0.44* 0.68*   GLUCOSE mg/dL 93 110* 120*   SODIUM mmol/L 134* 139 136   POTASSIUM mmol/L 3.5 4.1 3.8   TOTAL CO2 mmol/L 31*  --   --    CO2 mmol/L  --  32.0* 32.0*   CHLORIDE mmol/L 94* 101 97*   ANION GAP mmol/L 9 6.0 7.0   BUN / CREAT RATIO   --  22.7 10.3   CALCIUM mg/dL 9.4 8.9 9.0   EGFR IF NONAFRICN AM  >60 >150 139   ALK PHOS U/L 84  --  93   TOTAL PROTEIN g/dL 7.4  --  7.3   ALT (SGPT) U/L 12  --  16   AST (SGOT) U/L 12  --  37   BILIRUBIN mg/dL 0.3  --  0.3   ALBUMIN g/dL 4.2  --  4.50   GLOBULIN gm/dL  --   --  2.8       No results for input(s): MSPIKE, KAPPALAMB, IGLFLC, URICACID, FREEKAPPAL, CEA, LDH, REFLABREPO in the last 28123 hours.    No results for input(s): IRON, TIBC, LABIRON, FERRITIN, [id number], FOLATE, TSH, P2YBXXK in the last 80162 hours.    Invalid input(s): SATURATION    CT Soft Tissue Neck With Contrast    Result Date: 10/13/2021  Impression:    1. There is an enhancing soft tissue mass which completely fills and also expands the nasopharyngeal airway, extending anteriorly into the posterior nasal cavity and extending inferiorly into the oropharyngeal airway. My primary consideration is a massive adenoid hypertrophy without abscess given the location and lack of aggressive feature. After accounting for the size, which is quite impressive, there appears circumscribed and there are no aggressive features such as evidence for extension beyond the mucosal space or destructive bony change. Neoplasia seems less likely. ENT follow-up and/or follow-up CT should be considered. 2. Left mastoid and left middle ear effusion which is likely secondary to eustachian tube obstruction. This report was finalized on 10/13/2021 10:25 by Dr Gaston Quezada, .      ASSESSMENT:   Nasopharyngeal Burkitt lymphoma, untreated.  Immediate treatment with chemotherapy is indicated.    The patient appears to be healthy otherwise and has excellent performance status.  He appears to be a good  candidate for chemotherapy.    PLAN/RECOMMENDATIONS:   Unfortunately we do not treat highly aggressive lymphomas at our facility.  The patient was advised to seek treatment at an academic center immediately.    VST PRN.    DX/ORDERS:   Diagnoses and all orders for this visit:    1. 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     Thank you very much for having referred this very pleasant patient.    Salas Cool MD  11/8/2021    cc: Clayton Dorado, Provider, No Known

## 2021-11-09 LAB
BLOOD CULTURE, ROUTINE: NORMAL
CULTURE, BLOOD 2: NORMAL

## 2021-11-10 ENCOUNTER — PATIENT ROUNDING (BHMG ONLY) (OUTPATIENT)
Dept: ONCOLOGY | Facility: CLINIC | Age: 31
End: 2021-11-10

## 2021-11-10 NOTE — PROGRESS NOTES
November 10, 2021    Hello, may I speak with Paras Bronson?    My name is Jia Ch.     I am  with [facility]AT[facility]  [address]  [facility] [address] [phone].    Before we get started may I verify your date of birth? 1990    I am calling to officially welcome you to our practice and ask about your recent visit. Is this a good time to talk? Yes     Tell me about your visit with us. What things went well?  Good       We're always looking for ways to make our patients' experiences even better. Do you have recommendations on ways we may improve?  No    Overall were you satisfied with your first visit to our practice? Follow up as needed       I appreciate you taking the time to speak with me today. Is there anything else I can do for you? No      Thank you, and have a great day.

## 2021-11-15 LAB
CYTO UR: NORMAL
LAB AP CASE REPORT: NORMAL
PATH REPORT.FINAL DX SPEC: NORMAL
PATH REPORT.GROSS SPEC: NORMAL

## 2021-11-16 ENCOUNTER — DOCUMENTATION (OUTPATIENT)
Dept: ONCOLOGY | Facility: CLINIC | Age: 31
End: 2021-11-16

## 2021-11-16 NOTE — PROGRESS NOTES
I called the patient's home listed number.  I spoke with his mother in law.  She informed me that the patient is being treated at a large hospital in [place] and completed the first cycle of chemotherapy without any problems and is currently doing well.